# Patient Record
Sex: MALE
[De-identification: names, ages, dates, MRNs, and addresses within clinical notes are randomized per-mention and may not be internally consistent; named-entity substitution may affect disease eponyms.]

---

## 2017-04-10 ENCOUNTER — HOSPITAL ENCOUNTER (INPATIENT)
Dept: HOSPITAL 61 - 4 NORTH | Age: 62
LOS: 1 days | Discharge: HOME | DRG: 694 | End: 2017-04-11
Attending: INTERNAL MEDICINE | Admitting: INTERNAL MEDICINE
Payer: COMMERCIAL

## 2017-04-10 ENCOUNTER — HOSPITAL ENCOUNTER (EMERGENCY)
Dept: HOSPITAL 63 - ER | Age: 62
Discharge: ANOTHER HOSPITAL | End: 2017-04-10
Payer: COMMERCIAL

## 2017-04-10 VITALS — DIASTOLIC BLOOD PRESSURE: 76 MMHG | SYSTOLIC BLOOD PRESSURE: 147 MMHG

## 2017-04-10 VITALS — DIASTOLIC BLOOD PRESSURE: 57 MMHG | SYSTOLIC BLOOD PRESSURE: 122 MMHG

## 2017-04-10 VITALS — SYSTOLIC BLOOD PRESSURE: 162 MMHG | DIASTOLIC BLOOD PRESSURE: 92 MMHG

## 2017-04-10 VITALS — SYSTOLIC BLOOD PRESSURE: 149 MMHG | DIASTOLIC BLOOD PRESSURE: 84 MMHG

## 2017-04-10 VITALS — SYSTOLIC BLOOD PRESSURE: 148 MMHG | DIASTOLIC BLOOD PRESSURE: 89 MMHG

## 2017-04-10 VITALS — SYSTOLIC BLOOD PRESSURE: 157 MMHG | DIASTOLIC BLOOD PRESSURE: 71 MMHG

## 2017-04-10 VITALS — DIASTOLIC BLOOD PRESSURE: 74 MMHG | SYSTOLIC BLOOD PRESSURE: 140 MMHG

## 2017-04-10 VITALS — DIASTOLIC BLOOD PRESSURE: 87 MMHG | SYSTOLIC BLOOD PRESSURE: 121 MMHG

## 2017-04-10 VITALS — SYSTOLIC BLOOD PRESSURE: 149 MMHG | DIASTOLIC BLOOD PRESSURE: 103 MMHG

## 2017-04-10 DIAGNOSIS — N20.0: Primary | ICD-10-CM

## 2017-04-10 DIAGNOSIS — N20.2: Primary | ICD-10-CM

## 2017-04-10 DIAGNOSIS — N35.9: ICD-10-CM

## 2017-04-10 DIAGNOSIS — I10: ICD-10-CM

## 2017-04-10 DIAGNOSIS — E11.9: ICD-10-CM

## 2017-04-10 DIAGNOSIS — Z84.1: ICD-10-CM

## 2017-04-10 LAB
ALP SERPL-CCNC: 89 U/L (ref 46–116)
ALT SERPL-CCNC: 33 U/L (ref 16–63)
ANION GAP SERPL CALC-SCNC: 10 MMOL/L (ref 6–14)
AST SERPL-CCNC: 19 U/L (ref 15–37)
BASOPHILS # BLD AUTO: 0.1 X10^3/UL (ref 0–0.2)
BASOPHILS NFR BLD: 1 % (ref 0–3)
CA-I SERPL ISE-MCNC: 19 MG/DL (ref 8–26)
CALCIUM SERPL-MCNC: 8.8 MG/DL (ref 8.5–10.1)
CHLORIDE SERPL-SCNC: 108 MMOL/L (ref 98–107)
CO2 SERPL-SCNC: 25 MMOL/L (ref 21–32)
CREAT SERPL-MCNC: 1.4 MG/DL (ref 0.7–1.3)
EOSINOPHIL NFR BLD: 0.2 X10^3/UL (ref 0–0.7)
EOSINOPHIL NFR BLD: 2 % (ref 0–3)
ERYTHROCYTE [DISTWIDTH] IN BLOOD BY AUTOMATED COUNT: 13 % (ref 11.5–14.5)
GFR SERPLBLD BASED ON 1.73 SQ M-ARVRAT: 51 ML/MIN
GLUCOSE SERPL-MCNC: 140 MG/DL (ref 70–99)
HCT VFR BLD CALC: 44.3 % (ref 39–53)
HGB BLD-MCNC: 15 G/DL (ref 13–17.5)
LIPASE: 115 U/L (ref 73–393)
LYMPHOCYTES # BLD: 2.2 X10^3/UL (ref 1–4.8)
LYMPHOCYTES NFR BLD AUTO: 18 % (ref 24–48)
MCH RBC QN AUTO: 30 PG (ref 25–35)
MCHC RBC AUTO-ENTMCNC: 34 G/DL (ref 31–37)
MCV RBC AUTO: 88 FL (ref 79–100)
MONO #: 1 X10^3/UL (ref 0–1.1)
MONOCYTES NFR BLD: 8 % (ref 0–9)
NEUT #: 8.6 X10^3UL (ref 1.8–7.7)
NEUTROPHILS NFR BLD AUTO: 71 % (ref 31–73)
PLATELET # BLD AUTO: 205 X10^3/UL (ref 140–400)
POTASSIUM SERPL-SCNC: 3.7 MMOL/L (ref 3.5–5.1)
RBC # BLD AUTO: 5.01 X10^6/UL (ref 4.3–5.7)
SODIUM SERPL-SCNC: 143 MMOL/L (ref 136–145)
WBC # BLD AUTO: 12 X10^3/UL (ref 4–11)

## 2017-04-10 PROCEDURE — 74176 CT ABD & PELVIS W/O CONTRAST: CPT

## 2017-04-10 PROCEDURE — 99285 EMERGENCY DEPT VISIT HI MDM: CPT

## 2017-04-10 PROCEDURE — S0028 INJECTION, FAMOTIDINE, 20 MG: HCPCS

## 2017-04-10 PROCEDURE — 90732 PPSV23 VACC 2 YRS+ SUBQ/IM: CPT

## 2017-04-10 PROCEDURE — 36415 COLL VENOUS BLD VENIPUNCTURE: CPT

## 2017-04-10 PROCEDURE — 80048 BASIC METABOLIC PNL TOTAL CA: CPT

## 2017-04-10 PROCEDURE — 0T768DZ DILATION OF RIGHT URETER WITH INTRALUMINAL DEVICE, VIA NATURAL OR ARTIFICIAL OPENING ENDOSCOPIC: ICD-10-PCS | Performed by: UROLOGY

## 2017-04-10 PROCEDURE — 96374 THER/PROPH/DIAG INJ IV PUSH: CPT

## 2017-04-10 PROCEDURE — 74000: CPT

## 2017-04-10 PROCEDURE — 85027 COMPLETE CBC AUTOMATED: CPT

## 2017-04-10 PROCEDURE — C2617 STENT, NON-COR, TEM W/O DEL: HCPCS

## 2017-04-10 PROCEDURE — BT1D1ZZ FLUOROSCOPY OF RIGHT KIDNEY, URETER AND BLADDER USING LOW OSMOLAR CONTRAST: ICD-10-PCS | Performed by: UROLOGY

## 2017-04-10 PROCEDURE — 96375 TX/PRO/DX INJ NEW DRUG ADDON: CPT

## 2017-04-10 PROCEDURE — 84460 ALANINE AMINO (ALT) (SGPT): CPT

## 2017-04-10 PROCEDURE — 74420 UROGRAPHY RTRGR +-KUB: CPT

## 2017-04-10 PROCEDURE — 84450 TRANSFERASE (AST) (SGOT): CPT

## 2017-04-10 PROCEDURE — 84075 ASSAY ALKALINE PHOSPHATASE: CPT

## 2017-04-10 PROCEDURE — 83690 ASSAY OF LIPASE: CPT

## 2017-04-10 PROCEDURE — C1769 GUIDE WIRE: HCPCS

## 2017-04-10 PROCEDURE — 82947 ASSAY GLUCOSE BLOOD QUANT: CPT

## 2017-04-10 RX ADMIN — HYDROCODONE BITARTRATE AND ACETAMINOPHEN PRN TAB: 5; 325 TABLET ORAL at 23:33

## 2017-04-10 RX ADMIN — DOCUSATE SODIUM SCH MG: 100 CAPSULE, LIQUID FILLED ORAL at 23:33

## 2017-04-10 RX ADMIN — BACITRACIN SCH MLS/HR: 5000 INJECTION, POWDER, FOR SOLUTION INTRAMUSCULAR at 23:35

## 2017-04-10 RX ADMIN — FENTANYL CITRATE PRN MCG: 50 INJECTION INTRAMUSCULAR; INTRAVENOUS at 18:43

## 2017-04-10 RX ADMIN — FENTANYL CITRATE PRN MCG: 50 INJECTION INTRAMUSCULAR; INTRAVENOUS at 21:02

## 2017-04-10 RX ADMIN — MORPHINE SULFATE PRN MG: 4 INJECTION, SOLUTION INTRAMUSCULAR; INTRAVENOUS at 23:34

## 2017-04-10 RX ADMIN — FENTANYL CITRATE PRN MCG: 50 INJECTION INTRAMUSCULAR; INTRAVENOUS at 18:36

## 2017-04-10 NOTE — ED.ADGEN
Past History


Past Medical History:  Diabetes, Hypertension, Other


Past Surgical History:  Other


Alcohol Use:  None


Drug Use:  None





Adult General


HPI


HPI





Patient is a 62-year-old male presents emergency department complaining of 

right flank pain that began suddenly just prior to arrival. He has had no 

associated nausea or vomiting. He has had no fever or chills. He denies any 

history of kidney or renal issues in the past. He has had no prehospital 

intervention.





Review of Systems


Review of Systems





Constitutional: Denies fever or chills []


Eyes: Denies change in visual acuity, redness, or eye pain []


HENT: Denies nasal congestion or sore throat []


Respiratory: Denies cough or shortness of breath []


Cardiovascular: No additional information not addressed in HPI []


GI: Denies abdominal pain, nausea, vomiting, bloody stools or diarrhea []


: Denies dysuria or hematuria []


Musculoskeletal: Denies back pain or joint pain []


Integument: Denies rash or skin lesions []


Neurologic: Denies headache, focal weakness or sensory changes []


Endocrine: Denies polyuria or polydipsia []





Current Medications


Current Medications





 Current Medications








 Medications


  (Trade)  Dose


 Ordered  Sig/Radha  Start Time


 Stop Time Status Last Admin


Dose Admin


 


 Fentanyl Citrate


  (Fentanyl 2ml


 Vial)  75 mcg  1X  ONCE  4/10/17 12:15


 4/10/17 12:16 DC 4/10/17 12:16


75 MCG


 


 Hydromorphone HCl


  (Dilaudid)  1 mg  1X  ONCE  4/10/17 13:15


 4/10/17 13:16 DC 4/10/17 13:05


1 MG


 


 Ketorolac


 Tromethamine


  (Toradol)  15 mg  1X  ONCE  4/10/17 13:15


 4/10/17 13:16 DC  


 


 


 Ondansetron HCl


  (Zofran)  4 mg  1X  ONCE  4/10/17 12:20


 4/10/17 12:21 DC 4/10/17 12:16


4 MG











Allergies


Allergies





 Allergies








Coded Allergies Type Severity Reaction Last Updated Verified


 


  No Known Drug Allergies    4/10/17 No











Physical Exam


Physical Exam





Constitutional: Well developed, well nourished, no acute distress, non-toxic 

appearance. []


HENT: Normocephalic, atraumatic, bilateral external ears normal, oropharynx 

moist, no oral exudates, nose normal. []


Eyes: PERRLA, EOMI, conjunctiva normal, no discharge. [] 


Neck: Normal range of motion, no tenderness, supple, no stridor. [] 


Cardiovascular:Heart rate regular rhythm, no murmur []


Lungs & Thorax:  Bilateral breath sounds clear to auscultation []


Abdomen: Bowel sounds normal, soft, no tenderness, no masses, no pulsatile 

masses. [] 


Skin: Warm, dry, no erythema, no rash. [] 


Back: No tenderness, right CVA tenderness. [] 


Extremities: No tenderness, no cyanosis, no clubbing, ROM intact, no edema. [] 


Neurologic: Alert and oriented X 3, normal motor function, normal sensory 

function, no focal deficits noted. []


Psychologic: Affect normal, judgement normal, mood normal. []





Current Patient Data


Vital Signs





 Vital Signs








  Date Time  Temp Pulse Resp B/P Pulse Ox O2 Delivery O2 Flow Rate FiO2


 


4/10/17 14:06 97.3 70 16 163/74 98 Room Air  








Lab Results





 Laboratory Tests








Test


  4/10/17


11:50


 


White Blood Count


  12.0x10^3/uL


(4.0-11.0)  H


 


Red Blood Count


  5.01x10^6/uL


(4.30-5.70)


 


Hemoglobin


  15.0g/dL


(13.0-17.5)


 


Hematocrit


  44.3%


(39.0-53.0)


 


Mean Corpuscular Volume 88fL ()  


 


Mean Corpuscular Hemoglobin 30pg (25-35)  


 


Mean Corpuscular Hemoglobin


Concent 34g/dL (31-37)


 


 


Red Cell Distribution Width


  13.0%


(11.5-14.5)


 


Platelet Count


  205x10^3/uL


(140-400)


 


Neutrophils (%) (Auto) 71% (31-73)  


 


Lymphocytes (%) (Auto) 18% (24-48)  L


 


Monocytes (%) (Auto) 8% (0-9)  


 


Eosinophils (%) (Auto) 2% (0-3)  


 


Basophils (%) (Auto) 1% (0-3)  


 


Neutrophils # (Auto)


  8.6x10^3uL


(1.8-7.7)  H


 


Lymphocytes # (Auto)


  2.2x10^3/uL


(1.0-4.8)


 


Monocytes # (Auto)


  1.0x10^3/uL


(0.0-1.1)


 


Eosinophils # (Auto)


  0.2x10^3/uL


(0.0-0.7)


 


Basophils # (Auto)


  0.1x10^3/uL


(0.0-0.2)


 


Sodium Level


  143mmol/L


(136-145)


 


Potassium Level


  3.7mmol/L


(3.5-5.1)


 


Chloride Level


  108mmol/L


()  H


 


Carbon Dioxide Level


  25mmol/L


(21-32)


 


Anion Gap 10 (6-14)  


 


Blood Urea Nitrogen


  19mg/dL (8-26)


 


 


Creatinine


  1.4mg/dL


(0.7-1.3)  H


 


Estimated GFR


(Cockcroft-Gault) 51.0  


 


 


Glucose Level


  140mg/dL


(70-99)  H


 


Calcium Level


  8.8mg/dL


(8.5-10.1)


 


Aspartate Amino Transferase


(AST) 19U/L (15-37)  


 


 


Alanine Aminotransferase (ALT) 33U/L (16-63)  


 


Alkaline Phosphatase


  89U/L ()


 


 


Lipase


  115U/L


()











EKG


EKG


[]





Radiology/Procedures


Radiology/Procedures


CT of the abdomen and pelvis without contrast, 4/10/2017:





History: Right flank pain





Noncontrast scans were obtained through the urinary tract utilizing the renal


stone protocol. This is a limited study for evaluation of the possibility of


urinary tract calculi.





There is moderate perinephric edema on the right. The right renal pelvis and


proximal right ureter are mildly dilated. There is a 9 x 5 mm obstructing


calculus in the proximal right ureter at the L2-3 level. The distal right


ureter is unremarkable.





There is a 10 mm nonobstructing calculus in the lower pole of the left kidney.


The left renal collecting system and left ureter are unremarkable. The


partially filled urinary bladder shows no abnormality.





The prostate gland is mildly enlarged measuring 5.5 cm in width. It contains


calcifications.





The unopacified liver is unremarkable. No gallbladder abnormality is seen.


There is fatty infiltration of the pancreas. The spleen is of normal size.





There is mild aortic calcific plaquing without evidence of aneurysm. No


abdominal or pelvic adenopathy is seen.





Several small sigmoid diverticula are noted. No paracolonic inflammation is


evident. The bowel loops are not dilated. The appendix is visualized and shows


no abnormality. No free fluid or free air is evident in the abdomen or pelvis.





Moderate degenerative changes are present in the lower lumbar spine. There is


a chronic appearing mild spondylolisthesis at L5-S1 with partial bony fusion


of the disc space.








IMPRESSION:


1. 9 x 5 mm obstructing calculus in the proximal right ureter with moderate


perinephric edema.


2. Single nonobstructing left intrarenal calculus.


3. Mild prostatic enlargement.

















PQRS Compliance Statement:





One or more of the following individualized dose reduction techniques were


utilized for this examination:


1. Automated exposure control


2. Adjustment of the mA and/or kV according to patient size


3. Use of iterative reconstruction technique[]





Course & Med Decision Making


Course & Med Decision Making


Pertinent Labs and Imaging studies reviewed. (See chart for details)





[]





Final Impression


Final Impression


Kidney stone []


Problems:  





Dragon Disclaimer


Dragon Disclaimer


This electronic medical record was generated, in whole or in part, using a 

voice recognition dictation system.








JORDI PÉREZ MD Apr 10, 2017 12:27

## 2017-04-10 NOTE — PDOC
PROGRESS NOTES


Subjective


Subjective


Pt. with obstructing 10 X 5 mm right proximal ureteral stone





Objective


Objective


right proximal ureteral stone





Physical Exam


Physical Exam


Tender right flank and abdomen





Assessment


Assessment


I discussed with the patient his stone and we discussed the options, 

alternatives, benefits, risks, and possible complications of medical expulsive 

therapy vs. surgical intervention with cystoscopy, right retrograde pyelogram, 

and possible right ureteral stent placement. Pt. understands and wishes to 

proceed with operation. Will proceed accordingly.





Comment


Review of Relevant


I have reviewed the following items oseas (where applicable) has been applied.








ANGELES MCELROY MD Apr 10, 2017 16:16

## 2017-04-10 NOTE — OP
DATE OF SURGERY:  04/10/2017



OPERATION:  Cystoscopy, right retrograde pyelogram; right ureteral stent

placement and Carroll catheter placement.



SURGEON:  Angeles Zhang MD



ANESTHESIA:  General.



PREOPERATIVE DIAGNOSIS:  Right ureteral stone.



POSTOPERATIVE DIAGNOSIS:  Right ureteral stone with urethral stricture.



INDICATIONS:  The patient is a very pleasant 62-year-old white male, who

presented with right-sided flank and abdominal pain, was found to have a 10 x 5

mm right proximal ureteral stone with obstruction.  The patient continues to

have right renal colic.  I discussed with the patient the option of

alternatives, benefits, risks and possible complications of medical expulsive

therapy versus surgical intervention with cystoscopy, right retrograde

pyelogram, possible right ureteral stent placement.  He understands this and he

wish to proceed ahead with operation.



DESCRIPTION OF PROCEDURE:  After obtaining informed consent, the patient was

taken to operating room and after an excellent general anesthetic, the patient

was placed in a dorsolithotomy position.  Groin was prepped and draped in

sterile fashion.  The patient was preloaded with IV antibiotics.  Panendoscopy

and cystoscopy were then performed with the 30 and 70-degree lens and the

21-Yoruba cystoscope sheath.  Penile urethra was found grossly normal.  The

patient noted to have a fairly tight, but soft bulbar urethral stricture, which

was able to be dilated with the beak of the cystoscope under direct vision. 

External sphincter appeared intact.  Prostatic urethra showed some moderate

bilobar enlargement.  Bladder was entered and inspected.  The patient noted to

have moderate trabeculation of bladder.  Both ureteral orifices were identified

and found to be grossly patent.  On fluoroscopy, the stone was seen in the area

of the right proximal ureter, right retrograde pyelogram was performed and the

filling defect was seen in the right proximal ureter, consistent with the

patient's stone with dilation of the right proximal ureter, renal pelvis and

calices.  Following this, floppy-tipped ZIPwire was passed up the right ureter,

past the stone to the right kidney and it appeared that the stone rolled back to

the right renal pelvis at the time of the floppy-tipped ZIPwire placement. 

Following this, a 6 x 28 double-J stent was then passed up the ZIPwire, placing

one curl in the renal pelvis and the other curl in the bladder and the ZIPwire

removed.  Stent position was confirmed with fluoroscopy and direct vision, found

to be in good position.  Following this, the cystoscope was withdrawn from the

patient and a 16-Yoruba Carroll catheter placed per urethra and bladder connected

to gravity drainage.  Clear drainage was noted.  Therefore, Carroll balloon was

inflated and the catheter connected to gravity drainage and secured the

patient's left thigh with a StatLock.  The patient tolerated the procedure very

well, was taken to recovery room in stable condition.



Plan will be to remove the Carroll catheter in the first postoperative morning

then the patient discharged home and then have him follow up with Urology in the

next week ____ schedule definitive treatment of the stone as an outpatient with

either ESWL or ureteroscopy and laser lithotripsy.

 



______________________________

ANGELES ZHANG MD



DR:  DEANNA/gordon  JOB#:  [id number] / [id number]

DD:  04/10/2017 18:21  DT:  04/10/2017 22:37

## 2017-04-10 NOTE — RAD
CT of the abdomen and pelvis without contrast, 4/10/2017:



History: Right flank pain



Noncontrast scans were obtained through the urinary tract utilizing the renal

stone protocol. This is a limited study for evaluation of the possibility of

urinary tract calculi.



There is moderate perinephric edema on the right. The right renal pelvis and

proximal right ureter are mildly dilated. There is a 9 x 5 mm obstructing

calculus in the proximal right ureter at the L2-3 level. The distal right

ureter is unremarkable.



There is a 10 mm nonobstructing calculus in the lower pole of the left kidney.

The left renal collecting system and left ureter are unremarkable. The

partially filled urinary bladder shows no abnormality.



The prostate gland is mildly enlarged measuring 5.5 cm in width. It contains

calcifications.



The unopacified liver is unremarkable. No gallbladder abnormality is seen.

There is fatty infiltration of the pancreas. The spleen is of normal size.



There is mild aortic calcific plaquing without evidence of aneurysm. No

abdominal or pelvic adenopathy is seen.



Several small sigmoid diverticula are noted. No paracolonic inflammation is

evident. The bowel loops are not dilated. The appendix is visualized and shows

no abnormality. No free fluid or free air is evident in the abdomen or pelvis.



Moderate degenerative changes are present in the lower lumbar spine. There is

a chronic appearing mild spondylolisthesis at L5-S1 with partial bony fusion

of the disc space.





IMPRESSION:

1. 9 x 5 mm obstructing calculus in the proximal right ureter with moderate

perinephric edema.

2. Single nonobstructing left intrarenal calculus.

3. Mild prostatic enlargement.











PQRS Compliance Statement:



One or more of the following individualized dose reduction techniques were

utilized for this examination:

1. Automated exposure control

2. Adjustment of the mA and/or kV according to patient size

3. Use of iterative reconstruction technique

## 2017-04-10 NOTE — PDOC4
Operative Note


Operative Note


pre-op dx-right ureteral stone


post-op dx-same with urethral stricture


procedure-cystoscopy, right retrograde pyelogram, right ureteral stent placement

, alston catheter placement


surgeon-adan claire-general


Pt. to PACU in stable condition








ANGELES MCELROY MD Apr 10, 2017 18:16

## 2017-04-11 VITALS — SYSTOLIC BLOOD PRESSURE: 130 MMHG | DIASTOLIC BLOOD PRESSURE: 62 MMHG

## 2017-04-11 VITALS
DIASTOLIC BLOOD PRESSURE: 74 MMHG | SYSTOLIC BLOOD PRESSURE: 130 MMHG | SYSTOLIC BLOOD PRESSURE: 130 MMHG | DIASTOLIC BLOOD PRESSURE: 74 MMHG

## 2017-04-11 VITALS — DIASTOLIC BLOOD PRESSURE: 51 MMHG | SYSTOLIC BLOOD PRESSURE: 103 MMHG

## 2017-04-11 VITALS — DIASTOLIC BLOOD PRESSURE: 70 MMHG | SYSTOLIC BLOOD PRESSURE: 125 MMHG

## 2017-04-11 LAB
ANION GAP SERPL CALC-SCNC: 11 MMOL/L (ref 6–14)
BUN SERPL-MCNC: 18 MG/DL (ref 8–26)
CALCIUM SERPL-MCNC: 8.2 MG/DL (ref 8.5–10.1)
CHLORIDE SERPL-SCNC: 106 MMOL/L (ref 98–107)
CO2 SERPL-SCNC: 24 MMOL/L (ref 21–32)
CREAT SERPL-MCNC: 1.2 MG/DL (ref 0.7–1.3)
ERYTHROCYTE [DISTWIDTH] IN BLOOD BY AUTOMATED COUNT: 13.3 % (ref 11.5–14.5)
GFR SERPLBLD BASED ON 1.73 SQ M-ARVRAT: 61 ML/MIN
GLUCOSE SERPL-MCNC: 161 MG/DL (ref 70–99)
HCT VFR BLD CALC: 41.9 % (ref 39–53)
HGB BLD-MCNC: 13.9 G/DL (ref 13–17.5)
MCH RBC QN AUTO: 30 PG (ref 25–35)
MCHC RBC AUTO-ENTMCNC: 33 G/DL (ref 31–37)
MCV RBC AUTO: 90 FL (ref 79–100)
PLATELET # BLD AUTO: 192 X10^3/UL (ref 140–400)
POTASSIUM SERPL-SCNC: 4.1 MMOL/L (ref 3.5–5.1)
RBC # BLD AUTO: 4.66 X10^6/UL (ref 4.3–5.7)
SODIUM SERPL-SCNC: 141 MMOL/L (ref 136–145)
WBC # BLD AUTO: 10.9 X10^3/UL (ref 4–11)

## 2017-04-11 RX ADMIN — BACITRACIN SCH MLS/HR: 5000 INJECTION, POWDER, FOR SOLUTION INTRAMUSCULAR at 11:50

## 2017-04-11 RX ADMIN — CEFAZOLIN SCH MLS/HR: 1 INJECTION, POWDER, FOR SOLUTION INTRAMUSCULAR; INTRAVENOUS at 08:45

## 2017-04-11 RX ADMIN — CEFAZOLIN SCH MLS/HR: 1 INJECTION, POWDER, FOR SOLUTION INTRAMUSCULAR; INTRAVENOUS at 01:00

## 2017-04-11 RX ADMIN — HYDROCODONE BITARTRATE AND ACETAMINOPHEN PRN TAB: 5; 325 TABLET ORAL at 13:59

## 2017-04-11 RX ADMIN — MORPHINE SULFATE PRN MG: 4 INJECTION, SOLUTION INTRAMUSCULAR; INTRAVENOUS at 02:20

## 2017-04-11 RX ADMIN — DOCUSATE SODIUM SCH MG: 100 CAPSULE, LIQUID FILLED ORAL at 08:45

## 2017-04-11 RX ADMIN — CEFAZOLIN SCH MLS/HR: 1 INJECTION, POWDER, FOR SOLUTION INTRAMUSCULAR; INTRAVENOUS at 17:00

## 2017-04-11 NOTE — HP
ADMIT DATE:  04/10/2017



CHIEF COMPLAINT:  Flank pain.



HISTORY OF PRESENT ILLNESS:  The patient is a pleasant middle-aged male who

presents with right flank pain has been occurring for several days.



IMAGING STUDIES:  The ER showing a right 9 mm stone, the ER doctor has called

me, we are transferring the patient for Urology consultation.  The patient has

just been taken to the OR for cystoscopy with ureteral stent placement.  I

discussed the case with Dr. Zhang.  The patient is now being examined in the

postop area.



PAST MEDICAL HISTORY:  Diabetes.



ALLERGIES:  None.



FAMILY HISTORY:  Renal stone.



SOCIAL HISTORY:  Does not drink, smoke or take drugs.



MEDICATIONS:  Reviewed, please refer to the [facility].



REVIEW OF SYSTEMS:  Unobtainable, the patient is too confused.  He is still

____.



PHYSICAL EXAMINATION:

VITAL SIGNS:  Temperature afebrile, pulse 63, respirations 18, blood pressure

122/57.

GENERAL:  He is sleeping.  In the postop area, he awakens, he is confused.

HEART:  Distant S1, S2.

LUNGS:  Clear.

ABDOMEN:  Soft, positive bowel sounds.

EXTREMITIES:  No edema.

SKIN:  No rashes.

ENDOCRINE:  No thyromegaly.

LYMPHATICS:  No cervical nodes.

HEMATOPOIETIC:  No bruising.



LABORATORY DATA:  Pending.



ASSESSMENT AND PLAN:  Postop cystoscopy with right ureteral stent placement with

a 9 mm stone.  The patient has been admitted.  We were given IV fluids, IV

antibiotics, IV narcotics.  Hope to discharge tomorrow if he is doing better.

 



______________________________

MITCHEL LAUGHLIN DO DR:  BRYSON/gordon  JOB#:  [id number] / [id number]

DD:  04/10/2017 23:34  DT:  04/11/2017 00:19

## 2017-04-11 NOTE — PDOC
PROGRESS NOTES


Chief Complaint


Chief Complaint


Right Flank pain 





9mm obstructing Kidney stone


s/p cystoscopy with uretheral stent placement


Diabetes Mellitus, non-insulin dependent





History of Present Illness


History of Present Illness


Patient seen and evaluated at bedside. No acute events overnight. Carroll removed 

per Dr. Zhang. Patient reports continuing flank pain and urethral pain.  d/w 

nurse.





Vitals


Vitals





 Vital Signs








  Date Time  Temp Pulse Resp B/P Pulse Ox O2 Delivery O2 Flow Rate FiO2


 


4/11/17 11:00 98.1 61 20 130/62 96 Room Air  





 98.1       


 


4/10/17 18:43       10.0 











Physical Exam


General:  Alert, Oriented X3, No acute distress


Heart:  Regular rate, Normal S1


Lungs:  Clear, Other (negative accessory muscle use )


Abdomen:  Normal bowel sounds, Soft, No tenderness, Other (R flank tenderness 

to palpation. )


Extremities:  No clubbing, No cyanosis, No edema


Skin:  No rashes, No significant lesion, Other (Negative skin breakdown, 

erythema, or bleeding to glans penis. )





Labs


LABS





Laboratory Tests








Test


  4/10/17


16:50 4/11/17


06:50


 


Glucose (Fingerstick)


  125mg/dL


(70-99) 


 


 


White Blood Count


  


  10.9x10^3/uL


(4.0-11.0)


 


Red Blood Count


  


  4.66x10^6/uL


(4.30-5.70)


 


Hemoglobin


  


  13.9g/dL


(13.0-17.5)


 


Hematocrit


  


  41.9%


(39.0-53.0)


 


Mean Corpuscular Volume  90fL () 


 


Mean Corpuscular Hemoglobin  30pg (25-35) 


 


Mean Corpuscular Hemoglobin


Concent 


  33g/dL (31-37) 


 


 


Red Cell Distribution Width


  


  13.3%


(11.5-14.5)


 


Platelet Count


  


  192x10^3/uL


(140-400)


 


Sodium Level


  


  141mmol/L


(136-145)


 


Potassium Level


  


  4.1mmol/L


(3.5-5.1)


 


Chloride Level


  


  106mmol/L


()


 


Carbon Dioxide Level


  


  24mmol/L


(21-32)


 


Anion Gap  11 (6-14) 


 


Blood Urea Nitrogen  18mg/dL (8-26) 


 


Creatinine


  


  1.2mg/dL


(0.7-1.3)


 


Estimated GFR


(Cockcroft-Gault) 


  61.0 


 


 


Glucose Level


  


  161mg/dL


(70-99)


 


Calcium Level


  


  8.2mg/dL


(8.5-10.1)











Review of Systems


Review of Systems


(+) r flank pain


(+) penile pain 





Denies chest pain, sob, abdominal pain, n/v/d, or fever/chills.





Assessment and Plan


Assessmemt and Plan


 Problems


Medical Problems:


(1) Renal stone


Status: Acute  





Assessment:


1.) R 9mm obstructing Kidney stone


2.) POD#1 s/p cystoscopy with uretheral stent placement


3.) Diabetes Mellitus, non-insulin dependent 





Plan:


1.) continue IVF and pain control


2.) discharge agreeable with Dr. Zhang; Rx for pyridine, ciprofloxacin, and 

pain management. 


3.) f/u with Dr. Zhang in 1-2 weeks; Probable ESWL in 2-3 weeks. f/u with pcp 


4.) continue home medications


5.) activity as tolerated


Problems:  





Comment


Review of Relevant


I have reviewed the following items oseas (where applicable) has been applied.


Labs





Laboratory Tests








Test


  4/10/17


16:50 4/11/17


06:50


 


Glucose (Fingerstick)


  125mg/dL


(70-99) 


 


 


White Blood Count


  


  10.9x10^3/uL


(4.0-11.0)


 


Red Blood Count


  


  4.66x10^6/uL


(4.30-5.70)


 


Hemoglobin


  


  13.9g/dL


(13.0-17.5)


 


Hematocrit


  


  41.9%


(39.0-53.0)


 


Mean Corpuscular Volume  90fL () 


 


Mean Corpuscular Hemoglobin  30pg (25-35) 


 


Mean Corpuscular Hemoglobin


Concent 


  33g/dL (31-37) 


 


 


Red Cell Distribution Width


  


  13.3%


(11.5-14.5)


 


Platelet Count


  


  192x10^3/uL


(140-400)


 


Sodium Level


  


  141mmol/L


(136-145)


 


Potassium Level


  


  4.1mmol/L


(3.5-5.1)


 


Chloride Level


  


  106mmol/L


()


 


Carbon Dioxide Level


  


  24mmol/L


(21-32)


 


Anion Gap  11 (6-14) 


 


Blood Urea Nitrogen  18mg/dL (8-26) 


 


Creatinine


  


  1.2mg/dL


(0.7-1.3)


 


Estimated GFR


(Cockcroft-Gault) 


  61.0 


 


 


Glucose Level


  


  161mg/dL


(70-99)


 


Calcium Level


  


  8.2mg/dL


(8.5-10.1)








Laboratory Tests








Test


  4/10/17


16:50 4/11/17


06:50


 


Glucose (Fingerstick)


  125mg/dL


(70-99) 


 


 


White Blood Count


  


  10.9x10^3/uL


(4.0-11.0)


 


Red Blood Count


  


  4.66x10^6/uL


(4.30-5.70)


 


Hemoglobin


  


  13.9g/dL


(13.0-17.5)


 


Hematocrit


  


  41.9%


(39.0-53.0)


 


Mean Corpuscular Volume  90fL () 


 


Mean Corpuscular Hemoglobin  30pg (25-35) 


 


Mean Corpuscular Hemoglobin


Concent 


  33g/dL (31-37) 


 


 


Red Cell Distribution Width


  


  13.3%


(11.5-14.5)


 


Platelet Count


  


  192x10^3/uL


(140-400)


 


Sodium Level


  


  141mmol/L


(136-145)


 


Potassium Level


  


  4.1mmol/L


(3.5-5.1)


 


Chloride Level


  


  106mmol/L


()


 


Carbon Dioxide Level


  


  24mmol/L


(21-32)


 


Anion Gap  11 (6-14) 


 


Blood Urea Nitrogen  18mg/dL (8-26) 


 


Creatinine


  


  1.2mg/dL


(0.7-1.3)


 


Estimated GFR


(Cockcroft-Gault) 


  61.0 


 


 


Glucose Level


  


  161mg/dL


(70-99)


 


Calcium Level


  


  8.2mg/dL


(8.5-10.1)








Medications





 Current Medications


Ondansetron HCl 4 mg 4 mg STK-MED ONCE .ROUTE ;  Start 4/10/17 at 16:26;  Stop 4

/10/17 at 16:27;  Status DC


Propofol (Diprivan) 20 ml @ As Directed STK-MED ONCE IV ;  Start 4/10/17 at 16:

26;  Stop 4/10/17 at 16:27;  Status DC


Lidocaine HCl 5 ml 5 ml STK-MED ONCE .ROUTE ;  Start 4/10/17 at 16:26;  Stop 4/

10/17 at 16:27;  Status DC


Cefazolin Sodium/ Dextrose (Ancef 2gm Premix) 50 ml @ As Directed STK-MED ONCE 

IV ;  Start 4/10/17 at 16:30;  Stop 4/10/17 at 16:31;  Status DC


Lidocaine HCl (Glydo (Lidocaine) Jelly) 6 hortensia STK-MED ONCE .ROUTE  Last 

administered on 4/10/17at 18:02;  Start 4/10/17 at 16:43;  Stop 4/10/17 at 16:44

;  Status DC


Iohexol (Omnipaque 300 Mg/ml) 50 ml STK-MED ONCE .ROUTE  Last administered on 4/

10/17at 17:17;  Start 4/10/17 at 16:43;  Stop 4/10/17 at 16:44;  Status DC


Lidocaine HCl 6 hortensia 6 hortensia STK-MED ONCE .ROUTE  Last administered on 4/10/17at 18

:02;  Start 4/10/17 at 16:43;  Stop 4/10/17 at 16:44;  Status DC


Cefazolin Sodium/ Dextrose (Ancef 2gm Premix) 50 ml @  100 mls/hr 1X  ONCE IV  

Last administered on 4/10/17at 17:21;  Start 4/10/17 at 16:45;  Stop 4/10/17 at 

17:14;  Status DC


Fentanyl Citrate (Fentanyl 2ml Vial) 100 mcg STK-MED ONCE .ROUTE ;  Start 4/10/

17 at 16:52;  Stop 4/10/17 at 16:53;  Status DC


Dexamethasone Sodium Phosphate (Decadron) 20 mg STK-MED ONCE .ROUTE ;  Start 4/

10/17 at 17:35;  Stop 4/10/17 at 17:36;  Status DC


Famotidine (Pepcid) 20 mg STK-MED ONCE .ROUTE ;  Start 4/10/17 at 17:35;  Stop 4

/10/17 at 17:36;  Status DC


Ondansetron HCl (Zofran) 4 mg STK-MED ONCE .ROUTE ;  Start 4/10/17 at 17:35;  

Stop 4/10/17 at 17:36;  Status DC


Ondansetron HCl (Zofran) 4 mg PRN Q6HRS  PRN IV NAUSEA/VOMITING;  Start 4/10/17 

at 18:00;  Stop 4/11/17 at 17:59


Fentanyl Citrate (Fentanyl 2ml Vial) 25 mcg PRN Q5MIN  PRN IV MILD PAIN;  Start 

4/10/17 at 18:00;  Stop 4/11/17 at 17:59


Fentanyl Citrate (Fentanyl 2ml Vial) 50 mcg PRN Q5MIN  PRN IV MODERATE PAIN 

Last administered on 4/10/17at 21:02;  Start 4/10/17 at 18:00;  Stop 4/11/17 at 

17:59


Morphine Sulfate 1 mg 1 mg PRN Q10MIN  PRN IV SEVERE PAIN;  Start 4/10/17 at 18:

00;  Stop 4/11/17 at 17:59


Lactated Ringer's (Iv Lactated Ringers) 1,000 ml @  0 mls/hr Q0M IV ;  Start 4/

10/17 at 17:48;  Stop 4/11/17 at 05:47;  Status DC


Lidocaine HCl 2 ml PRN 1X  PRN ID PRIOR TO IV START;  Start 4/10/17 at 18:00;  

Stop 4/11/17 at 17:59


Hydromorphone HCl (Dilaudid) 0.5 mg PRN Q10MIN  PRN IV SEV PAIN, Second choice;

  Start 4/10/17 at 18:00;  Stop 4/11/17 at 17:59


Prochlorperazine Edisylate (Compazine) 5 mg PACU PRN  PRN IV NAUSEA, MRX1;  

Start 4/10/17 at 18:00;  Stop 4/11/17 at 17:59


Sevoflurane (Ultane) 60 ml STK-MED ONCE IH ;  Start 4/10/17 at 18:05;  Stop 4/10

/17 at 18:06;  Status DC


Sevoflurane (Ultane) 15 ml STK-MED ONCE IH ;  Start 4/10/17 at 18:05;  Stop 4/10

/17 at 18:06;  Status DC


Sevoflurane 30 ml 30 ml STK-MED ONCE IH ;  Start 4/10/17 at 18:05;  Stop 4/10/

17 at 18:06;  Status DC


Cefazolin Sodium/ Sodium Chloride (Ancef/Iv Sodium Chloride 0.9% 50ml) 50 ml @  

100 mls/hr Q8H IV  Last administered on 4/11/17at 08:45;  Start 4/11/17 at 01:00


Pneumococcal Polyvalent Vaccine (Do NOT chart on this placeholder) 1 each PRN 

1X  PRN MC SEE COMMENTS;  Start 4/10/17 at 18:45;  Status UNV


Pneumococcal Polyvalent Vaccine (Pneumovax 23) 0.5 ml ONCE ONCE VAX IM  Last 

administered on 4/11/17at 08:56;  Start 4/11/17 at 09:00;  Stop 4/11/17 at 09:01

;  Status DC


Morphine Sulfate 2 mg PRN Q2HR  PRN IV PAIN Last administered on 4/11/17at 08:46

;  Start 4/10/17 at 22:30


Morphine Sulfate 4 mg PRN Q2HR  PRN IV PAIN Last administered on 4/11/17at 02:20

;  Start 4/10/17 at 22:30


Acetaminophen/ Hydrocodone Bitart (Lortab 5/325) 1 tab PRN Q4HRS  PRN PO PAIN 

Last administered on 4/10/17at 23:33;  Start 4/10/17 at 22:30


Docusate Sodium 100 mg 100 mg BID PO  Last administered on 4/11/17at 08:45;  

Start 4/10/17 at 22:30


Sodium Chloride (Iv Sodium Chloride 0.9% 1000ml Bag) 1,000 ml @  75 mls/hr 

[id number] IV  Last administered on 4/10/17at 23:35;  Start 4/10/17 at 22:30





Active Scripts


Active


Reported


Metformin Hcl 500 Mg Tablet 1 Tab PO BID


Vitals/I & O





 Vital Sign - Last 24 Hours








 4/10/17 4/10/17 4/10/17 4/10/17





 16:00 16:00 16:30 18:12


 


Temp 97.4  97.8 





 97.4  97.8 


 


Pulse 73  57 


 


Resp 18  20 


 


B/P 121/87  158/91 


 


Pulse Ox 98  97 


 


O2 Delivery Room Air Room Air Room Air Mask


 


O2 Flow Rate    10


 


    





    





 4/10/17 4/10/17 4/10/17 4/10/17





 18:12 18:27 18:36 18:40


 


Temp 97.4   





 97.4   


 


Pulse 83 62  


 


Resp 18 18  


 


B/P 154/81 149/78  


 


Pulse Ox 95 100 100 


 


O2 Delivery Simple Mask Simple Mask Simple Mask Room Air


 


O2 Flow Rate 10 10 10.0 


 


    





    





 4/10/17 4/10/17 4/10/17 4/10/17





 18:42 18:43 19:15 19:30


 


Temp 99.1  98.1 98.3





 99.1  98.1 98.3


 


Pulse 62  95 72


 


Resp 18  18 20


 


B/P 157/73  147/76 157/71


 


Pulse Ox 98 100 95 94


 


O2 Delivery Room Air Room Air Room Air Room Air


 


O2 Flow Rate  10.0  


 


    





    





 4/10/17 4/10/17 4/10/17 4/10/17





 19:45 20:00 20:15 20:45


 


Temp 98.3  98.1 98.3





 98.3  98.1 98.3


 


Pulse 68  61 63


 


Resp 18  18 18


 


B/P 148/89  140/74 149/84


 


Pulse Ox 94  95 95


 


O2 Delivery Room Air Room Air Room Air Room Air


 


    





    





 4/10/17 4/10/17 4/10/17 4/10/17





 21:02 21:45 21:53 23:29


 


Temp  98.1  98.0





  98.1  98.0


 


Pulse  58  70


 


Resp  18 18 18


 


B/P  122/57  149/103


 


Pulse Ox  98  100


 


O2 Delivery Room Air Room Air Room Air Room Air


 


    





    





 4/10/17 4/10/17 4/11/17 4/11/17





 23:33 23:34 00:38 02:20


 


Resp 18 18 18 18


 


O2 Delivery Room Air Room Air Room Air Room Air





 4/11/17 4/11/17 4/11/17 4/11/17





 03:09 03:09 07:00 08:46


 


Temp  98.1 98.2 





  98.1 98.2 


 


Pulse  67 55 


 


Resp 18 18 20 


 


B/P  103/51 125/70 


 


Pulse Ox  95 96 


 


O2 Delivery Room Air Room Air Room Air Room Air


 


    





    





 4/11/17 4/11/17  





 09:30 11:00  


 


Temp  98.1  





  98.1  


 


Pulse  61  


 


Resp  20  


 


B/P  130/62  


 


Pulse Ox  96  


 


O2 Delivery Room Air Room Air  














 Intake and Output   


 


 4/10/17 4/10/17 4/11/17





 15:00 23:00 07:00


 


Intake Total  600 ml 480 ml


 


Output Total  0 ml 1150 ml


 


Balance  600 ml -670 ml














MITCHEL LAUGHLIN III DO Apr 11, 2017 13:43

## 2017-04-11 NOTE — CONS
DATE OF CONSULTATION:  04/10/2017



LOCATION:  The patient is in room 432.



HISTORY OF PRESENT ILLNESS:  The patient is a very pleasant 62-year-old white

male who was transferred over from [place] with a right proximal ureteral

stone.  The patient first started becoming symptomatic this morning with right

flank and abdominal pain.  The patient with no prior history of any stones.  The

patient continues to have right flank and abdominal pain.  Past medical history

is significant for diabetes and hypertension.  The patient takes metformin for

his diabetes, but has not been taking any medicines for his hypertension.  He

has no known drug allergies.  He has had previous orthopedic operations in the

past.  No urologic operations.  The patient's white count is 12.0, creatinine is

1.4.  The patient's CT abdomen and pelvis show a 9 x 5 mm obstructing calculus

in the right proximal ureter with moderate perinephric edema and a

nonobstructing left intrarenal calculus measuring about 10 mm.



PHYSICAL EXAMINATION:

ABDOMEN:  The patient with tenderness in the right flank and right upper and

middle abdomen, otherwise soft, nontender in the rest of the abdomen.

GENITOURINARY:  Testes are descended bilaterally.  No inguinal hernias.  Phallus

within normal limits.

RECTAL:  Good sphincter tone.  Prostate smooth, nontender, without nodules,

overall size 25 grams.



PLAN:  I talked with the patient concerning his right ureteral stone and we

discussed the options, alternatives, benefits, risks and possible complications

of medical expulsive therapy versus surgical intervention with cystoscopy, right

retrograde pyelogram, and right ureteral stent placement to get him

unobstructed.  He understands this and does wish to proceed with operation, we

will therefore proceed accordingly.  I certainly appreciate being allowed to

participate in this patient's care.

 



______________________________

ANGELES MCELROY MD



DR:  DEANNA/gordon  JOB#:  [id number] / [id number]

DD:  04/10/2017 15:57  DT:  04/11/2017 06:45

## 2017-04-11 NOTE — PDOC
PROGRESS NOTES


Subjective


Subjective


Pt. feeling ok





Objective


Objective





 Vital Signs








  Date Time  Temp Pulse Resp B/P Pulse Ox O2 Delivery O2 Flow Rate FiO2


 


4/11/17 07:00 98.2 55 20 125/70 96 Room Air  





 98.2       


 


4/10/17 18:43       10.0 














 Intake and Output 


 


 4/11/17





 07:00


 


Intake Total 1080 ml


 


Output Total 1150 ml


 


Balance -70 ml


 


 


 


Intake Oral 480 ml


 


IV Total 600 ml


 


Output Urine Total 1150 ml


 


Estimated Blood Loss 0 ml











Physical Exam


Physical Exam


alston in place


urine clear





Assessment


Assessment


Alston removed


Home today


Follow up 1-2 weeks


Probable ESWL in 2-3 weeks





Comment


Review of Relevant


I have reviewed the following items oseas (where applicable) has been applied.


Labs





Laboratory Tests








Test


  4/10/17


16:50 4/11/17


06:50


 


Glucose (Fingerstick)


  125mg/dL


(70-99) 


 


 


White Blood Count


  


  10.9x10^3/uL


(4.0-11.0)


 


Red Blood Count


  


  4.66x10^6/uL


(4.30-5.70)


 


Hemoglobin


  


  13.9g/dL


(13.0-17.5)


 


Hematocrit


  


  41.9%


(39.0-53.0)


 


Mean Corpuscular Volume  90fL () 


 


Mean Corpuscular Hemoglobin  30pg (25-35) 


 


Mean Corpuscular Hemoglobin


Concent 


  33g/dL (31-37) 


 


 


Red Cell Distribution Width


  


  13.3%


(11.5-14.5)


 


Platelet Count


  


  192x10^3/uL


(140-400)


 


Sodium Level


  


  141mmol/L


(136-145)


 


Potassium Level


  


  4.1mmol/L


(3.5-5.1)


 


Chloride Level


  


  106mmol/L


()


 


Carbon Dioxide Level


  


  24mmol/L


(21-32)


 


Anion Gap  11 (6-14) 


 


Blood Urea Nitrogen  18mg/dL (8-26) 


 


Creatinine


  


  1.2mg/dL


(0.7-1.3)


 


Estimated GFR


(Cockcroft-Gault) 


  61.0 


 


 


Glucose Level


  


  161mg/dL


(70-99)


 


Calcium Level


  


  8.2mg/dL


(8.5-10.1)








Laboratory Tests








Test


  4/10/17


16:50 4/11/17


06:50


 


Glucose (Fingerstick)


  125mg/dL


(70-99) 


 


 


White Blood Count


  


  10.9x10^3/uL


(4.0-11.0)


 


Red Blood Count


  


  4.66x10^6/uL


(4.30-5.70)


 


Hemoglobin


  


  13.9g/dL


(13.0-17.5)


 


Hematocrit


  


  41.9%


(39.0-53.0)


 


Mean Corpuscular Volume  90fL () 


 


Mean Corpuscular Hemoglobin  30pg (25-35) 


 


Mean Corpuscular Hemoglobin


Concent 


  33g/dL (31-37) 


 


 


Red Cell Distribution Width


  


  13.3%


(11.5-14.5)


 


Platelet Count


  


  192x10^3/uL


(140-400)


 


Sodium Level


  


  141mmol/L


(136-145)


 


Potassium Level


  


  4.1mmol/L


(3.5-5.1)


 


Chloride Level


  


  106mmol/L


()


 


Carbon Dioxide Level


  


  24mmol/L


(21-32)


 


Anion Gap  11 (6-14) 


 


Blood Urea Nitrogen  18mg/dL (8-26) 


 


Creatinine


  


  1.2mg/dL


(0.7-1.3)


 


Estimated GFR


(Cockcroft-Gault) 


  61.0 


 


 


Glucose Level


  


  161mg/dL


(70-99)


 


Calcium Level


  


  8.2mg/dL


(8.5-10.1)








Medications





 Current Medications


Ondansetron HCl 4 mg 4 mg STK-MED ONCE .ROUTE ;  Start 4/10/17 at 16:26;  Stop 4

/10/17 at 16:27;  Status DC


Propofol (Diprivan) 20 ml @ As Directed STK-MED ONCE IV ;  Start 4/10/17 at 16:

26;  Stop 4/10/17 at 16:27;  Status DC


Lidocaine HCl 5 ml 5 ml STK-MED ONCE .ROUTE ;  Start 4/10/17 at 16:26;  Stop 4/

10/17 at 16:27;  Status DC


Cefazolin Sodium/ Dextrose (Ancef 2gm Premix) 50 ml @ As Directed STK-MED ONCE 

IV ;  Start 4/10/17 at 16:30;  Stop 4/10/17 at 16:31;  Status DC


Lidocaine HCl (Glydo (Lidocaine) Jelly) 6 hortensia STK-MED ONCE .ROUTE  Last 

administered on 4/10/17at 18:02;  Start 4/10/17 at 16:43;  Stop 4/10/17 at 16:44

;  Status DC


Iohexol (Omnipaque 300 Mg/ml) 50 ml STK-MED ONCE .ROUTE  Last administered on 4/

10/17at 17:17;  Start 4/10/17 at 16:43;  Stop 4/10/17 at 16:44;  Status DC


Lidocaine HCl 6 hortensia 6 hortensia STK-MED ONCE .ROUTE  Last administered on 4/10/17at 18

:02;  Start 4/10/17 at 16:43;  Stop 4/10/17 at 16:44;  Status DC


Cefazolin Sodium/ Dextrose (Ancef 2gm Premix) 50 ml @  100 mls/hr 1X  ONCE IV  

Last administered on 4/10/17at 17:21;  Start 4/10/17 at 16:45;  Stop 4/10/17 at 

17:14;  Status DC


Fentanyl Citrate (Fentanyl 2ml Vial) 100 mcg STK-MED ONCE .ROUTE ;  Start 4/10/

17 at 16:52;  Stop 4/10/17 at 16:53;  Status DC


Dexamethasone Sodium Phosphate (Decadron) 20 mg STK-MED ONCE .ROUTE ;  Start 4/

10/17 at 17:35;  Stop 4/10/17 at 17:36;  Status DC


Famotidine (Pepcid) 20 mg STK-MED ONCE .ROUTE ;  Start 4/10/17 at 17:35;  Stop 4

/10/17 at 17:36;  Status DC


Ondansetron HCl (Zofran) 4 mg STK-MED ONCE .ROUTE ;  Start 4/10/17 at 17:35;  

Stop 4/10/17 at 17:36;  Status DC


Ondansetron HCl (Zofran) 4 mg PRN Q6HRS  PRN IV NAUSEA/VOMITING;  Start 4/10/17 

at 18:00;  Stop 4/11/17 at 17:59


Fentanyl Citrate (Fentanyl 2ml Vial) 25 mcg PRN Q5MIN  PRN IV MILD PAIN;  Start 

4/10/17 at 18:00;  Stop 4/11/17 at 17:59


Fentanyl Citrate (Fentanyl 2ml Vial) 50 mcg PRN Q5MIN  PRN IV MODERATE PAIN 

Last administered on 4/10/17at 21:02;  Start 4/10/17 at 18:00;  Stop 4/11/17 at 

17:59


Morphine Sulfate 1 mg 1 mg PRN Q10MIN  PRN IV SEVERE PAIN;  Start 4/10/17 at 18:

00;  Stop 4/11/17 at 17:59


Lactated Ringer's (Iv Lactated Ringers) 1,000 ml @  0 mls/hr Q0M IV ;  Start 4/

10/17 at 17:48;  Stop 4/11/17 at 05:47;  Status DC


Lidocaine HCl 2 ml PRN 1X  PRN ID PRIOR TO IV START;  Start 4/10/17 at 18:00;  

Stop 4/11/17 at 17:59


Hydromorphone HCl (Dilaudid) 0.5 mg PRN Q10MIN  PRN IV SEV PAIN, Second choice;

  Start 4/10/17 at 18:00;  Stop 4/11/17 at 17:59


Prochlorperazine Edisylate (Compazine) 5 mg PACU PRN  PRN IV NAUSEA, MRX1;  

Start 4/10/17 at 18:00;  Stop 4/11/17 at 17:59


Sevoflurane (Ultane) 60 ml STK-MED ONCE IH ;  Start 4/10/17 at 18:05;  Stop 4/10

/17 at 18:06;  Status DC


Sevoflurane (Ultane) 15 ml STK-MED ONCE IH ;  Start 4/10/17 at 18:05;  Stop 4/10

/17 at 18:06;  Status DC


Sevoflurane 30 ml 30 ml STK-MED ONCE IH ;  Start 4/10/17 at 18:05;  Stop 4/10/

17 at 18:06;  Status DC


Cefazolin Sodium/ Sodium Chloride (Ancef/Iv Sodium Chloride 0.9% 50ml) 50 ml @  

100 mls/hr Q8H IV  Last administered on 4/11/17at 01:00;  Start 4/11/17 at 01:00


Pneumococcal Polyvalent Vaccine (Do NOT chart on this placeholder) 1 each PRN 

1X  PRN MC SEE COMMENTS;  Start 4/10/17 at 18:45;  Status UNV


Pneumococcal Polyvalent Vaccine (Pneumovax 23) 0.5 ml ONCE ONCE VAX IM ;  Start 

4/11/17 at 09:00;  Stop 4/11/17 at 09:01


Morphine Sulfate 2 mg PRN Q2HR  PRN IV PAIN;  Start 4/10/17 at 22:30


Morphine Sulfate 4 mg PRN Q2HR  PRN IV PAIN Last administered on 4/11/17at 02:20

;  Start 4/10/17 at 22:30


Acetaminophen/ Hydrocodone Bitart (Lortab 5/325) 1 tab PRN Q4HRS  PRN PO PAIN 

Last administered on 4/10/17at 23:33;  Start 4/10/17 at 22:30


Docusate Sodium 100 mg 100 mg BID PO  Last administered on 4/10/17at 23:33;  

Start 4/10/17 at 22:30


Sodium Chloride (Iv Sodium Chloride 0.9% 1000ml Bag) 1,000 ml @  75 mls/hr 

[id number] IV  Last administered on 4/10/17at 23:35;  Start 4/10/17 at 22:30





Active Scripts


Active


Reported


Metformin Hcl 500 Mg Tablet 1 Tab PO BID


Vitals/I & O





 Vital Sign - Last 24 Hours








 4/10/17 4/10/17 4/10/17 4/10/17





 16:00 16:00 16:30 18:12


 


Temp 97.4  97.8 





 97.4  97.8 


 


Pulse 73  57 


 


Resp 18  20 


 


B/P 121/87  158/91 


 


Pulse Ox 98  97 


 


O2 Delivery Room Air Room Air Room Air Mask


 


O2 Flow Rate    10


 


    





    





 4/10/17 4/10/17 4/10/17 4/10/17





 18:12 18:27 18:36 18:40


 


Temp 97.4   





 97.4   


 


Pulse 83 62  


 


Resp 18 18  


 


B/P 154/81 149/78  


 


Pulse Ox 95 100 100 


 


O2 Delivery Simple Mask Simple Mask Simple Mask Room Air


 


O2 Flow Rate 10 10 10.0 


 


    





    





 4/10/17 4/10/17 4/10/17 4/10/17





 18:42 18:43 19:15 19:30


 


Temp 99.1  98.1 98.3





 99.1  98.1 98.3


 


Pulse 62  95 72


 


Resp 18  18 20


 


B/P 157/73  147/76 157/71


 


Pulse Ox 98 100 95 94


 


O2 Delivery Room Air Room Air Room Air Room Air


 


O2 Flow Rate  10.0  


 


    





    





 4/10/17 4/10/17 4/10/17 4/10/17





 19:45 20:00 20:15 20:45


 


Temp 98.3  98.1 98.3





 98.3  98.1 98.3


 


Pulse 68  61 63


 


Resp 18  18 18


 


B/P 148/89  140/74 149/84


 


Pulse Ox 94  95 95


 


O2 Delivery Room Air Room Air Room Air Room Air


 


    





    





 4/10/17 4/10/17 4/10/17 4/10/17





 21:02 21:45 21:53 23:29


 


Temp  98.1  98.0





  98.1  98.0


 


Pulse  58  70


 


Resp  18 18 18


 


B/P  122/57  149/103


 


Pulse Ox  98  100


 


O2 Delivery Room Air Room Air Room Air Room Air


 


    





    





 4/10/17 4/10/17 4/11/17 4/11/17





 23:33 23:34 00:38 02:20


 


Resp 18 18 18 18


 


O2 Delivery Room Air Room Air Room Air Room Air





 4/11/17 4/11/17 4/11/17 





 03:09 03:09 07:00 


 


Temp  98.1 98.2 





  98.1 98.2 


 


Pulse  67 55 


 


Resp 18 18 20 


 


B/P  103/51 125/70 


 


Pulse Ox  95 96 


 


O2 Delivery Room Air Room Air Room Air 














 Intake and Output   


 


 4/10/17 4/10/17 4/11/17





 15:00 23:00 07:00


 


Intake Total  600 ml 480 ml


 


Output Total  0 ml 1150 ml


 


Balance  600 ml -670 ml














ANGELES MCELROY MD Apr 11, 2017 08:39

## 2017-04-12 NOTE — DS
DATE OF DISCHARGE:  04/11/2017



ADMISSION DIAGNOSES:  Kidney stone.



DISCHARGE DIAGNOSIS:  Postop right ureteral stent placement.



HOSPITAL COURSE:  The patient is a pleasant 62-year-old male, who presented with

a 9-mm kidney stone.  He was admitted.  We consulted Dr. Zhang.  He was taken

for a ureteral stent.  Post-procedure, he is doing better.  We plan to discharge

with close outpatient followup.



DISPOSITION:  Home.



ACTIVITY:  As tolerated.



DIET:  Low sodium.



MEDICATIONS:  Please see the MRAD.



TOTAL TIME ON DISCHARGE:  33 minutes.

 



______________________________

MITCHEL LAUGHLIN DO



DR:  BRYSON/gordon  JOB#:  [id number] / [id number]

DD:  04/12/2017 14:23  DT:  04/12/2017 20:08

## 2017-05-09 ENCOUNTER — HOSPITAL ENCOUNTER (OUTPATIENT)
Dept: HOSPITAL 61 - RAD | Age: 62
Discharge: HOME | End: 2017-05-09
Attending: UROLOGY
Payer: COMMERCIAL

## 2017-05-09 DIAGNOSIS — N20.0: Primary | ICD-10-CM

## 2017-05-09 DIAGNOSIS — R10.9: ICD-10-CM

## 2017-05-09 PROCEDURE — 74000: CPT

## 2017-05-09 NOTE — RAD
Indication: Right flank pain and right stent placement.



Time of exam 1335 hours.



Correlation is made with prior exam from 4/11/2017.



Right double-J nephroureteral stent remains in place. Calcific densities

overlying the lower poles of both kidneys appears stable. No calculi along the

course of the right stent are identified.



Impression: Stable KUB when compared with exam from one month earlier.

## 2017-05-13 ENCOUNTER — HOSPITAL ENCOUNTER (EMERGENCY)
Dept: HOSPITAL 63 - ER | Age: 62
Discharge: ANOTHER HOSPITAL | End: 2017-05-13
Payer: COMMERCIAL

## 2017-05-13 VITALS
DIASTOLIC BLOOD PRESSURE: 70 MMHG | SYSTOLIC BLOOD PRESSURE: 144 MMHG | DIASTOLIC BLOOD PRESSURE: 70 MMHG | SYSTOLIC BLOOD PRESSURE: 144 MMHG | SYSTOLIC BLOOD PRESSURE: 144 MMHG | DIASTOLIC BLOOD PRESSURE: 70 MMHG | SYSTOLIC BLOOD PRESSURE: 144 MMHG | DIASTOLIC BLOOD PRESSURE: 70 MMHG

## 2017-05-13 VITALS — SYSTOLIC BLOOD PRESSURE: 148 MMHG | DIASTOLIC BLOOD PRESSURE: 82 MMHG

## 2017-05-13 VITALS — HEIGHT: 78 IN | BODY MASS INDEX: 30.89 KG/M2 | WEIGHT: 267 LBS

## 2017-05-13 DIAGNOSIS — N20.0: Primary | ICD-10-CM

## 2017-05-13 DIAGNOSIS — R79.89: ICD-10-CM

## 2017-05-13 DIAGNOSIS — I10: ICD-10-CM

## 2017-05-13 DIAGNOSIS — N17.9: ICD-10-CM

## 2017-05-13 DIAGNOSIS — N23: ICD-10-CM

## 2017-05-13 DIAGNOSIS — E11.9: ICD-10-CM

## 2017-05-13 LAB
ALBUMIN SERPL-MCNC: 3.8 G/DL (ref 3.4–5)
ALBUMIN/GLOB SERPL: 1.3 {RATIO} (ref 1–1.7)
ALP SERPL-CCNC: 84 U/L (ref 46–116)
ALT SERPL-CCNC: 30 U/L (ref 16–63)
ANION GAP SERPL CALC-SCNC: 11 MMOL/L (ref 6–14)
APTT PPP: YELLOW S
AST SERPL-CCNC: 17 U/L (ref 15–37)
BACTERIA #/AREA URNS HPF: 0 /HPF
BASOPHILS # BLD AUTO: 0.1 X10^3/UL (ref 0–0.2)
BASOPHILS NFR BLD: 1 % (ref 0–3)
BILIRUB SERPL-MCNC: 0.4 MG/DL (ref 0.2–1)
BILIRUB UR QL STRIP: (no result)
BUN/CREAT SERPL: 14 (ref 6–20)
CA-I SERPL ISE-MCNC: 24 MG/DL (ref 8–26)
CALCIUM SERPL-MCNC: 8.7 MG/DL (ref 8.5–10.1)
CHLORIDE SERPL-SCNC: 106 MMOL/L (ref 98–107)
CO2 SERPL-SCNC: 24 MMOL/L (ref 21–32)
CREAT SERPL-MCNC: 1.7 MG/DL (ref 0.7–1.3)
EOSINOPHIL NFR BLD: 0.2 X10^3/UL (ref 0–0.7)
EOSINOPHIL NFR BLD: 2 % (ref 0–3)
ERYTHROCYTE [DISTWIDTH] IN BLOOD BY AUTOMATED COUNT: 13.2 % (ref 11.5–14.5)
FIBRINOGEN PPP-MCNC: CLEAR MG/DL
GFR SERPLBLD BASED ON 1.73 SQ M-ARVRAT: 40.8 ML/MIN
GLOBULIN SER-MCNC: 2.9 G/DL (ref 2.2–3.8)
GLUCOSE SERPL-MCNC: 150 MG/DL (ref 70–99)
GLUCOSE UR STRIP-MCNC: (no result) MG/DL
HCT VFR BLD CALC: 43.2 % (ref 39–53)
HGB BLD-MCNC: 14.7 G/DL (ref 13–17.5)
LYMPHOCYTES # BLD: 2.1 X10^3/UL (ref 1–4.8)
LYMPHOCYTES NFR BLD AUTO: 21 % (ref 24–48)
MCH RBC QN AUTO: 30 PG (ref 25–35)
MCHC RBC AUTO-ENTMCNC: 34 G/DL (ref 31–37)
MCV RBC AUTO: 88 FL (ref 79–100)
MONO #: 1 X10^3/UL (ref 0–1.1)
MONOCYTES NFR BLD: 10 % (ref 0–9)
NEUT #: 6.6 X10^3UL (ref 1.8–7.7)
NEUTROPHILS NFR BLD AUTO: 66 % (ref 31–73)
NITRITE UR QL STRIP: (no result)
PLATELET # BLD AUTO: 187 X10^3/UL (ref 140–400)
POTASSIUM SERPL-SCNC: 3.9 MMOL/L (ref 3.5–5.1)
PROT SERPL-MCNC: 6.7 G/DL (ref 6.4–8.2)
RBC # BLD AUTO: 4.9 X10^6/UL (ref 4.3–5.7)
RBC #/AREA URNS HPF: 0 /HPF (ref 0–2)
SODIUM SERPL-SCNC: 141 MMOL/L (ref 136–145)
SP GR UR STRIP: 1.01
SQUAMOUS #/AREA URNS LPF: (no result) /LPF
UROBILINOGEN UR-MCNC: 0.2 MG/DL
WBC # BLD AUTO: 10 X10^3/UL (ref 4–11)
WBC #/AREA URNS HPF: (no result) /HPF (ref 0–4)

## 2017-05-13 PROCEDURE — 80053 COMPREHEN METABOLIC PANEL: CPT

## 2017-05-13 PROCEDURE — 96376 TX/PRO/DX INJ SAME DRUG ADON: CPT

## 2017-05-13 PROCEDURE — 85027 COMPLETE CBC AUTOMATED: CPT

## 2017-05-13 PROCEDURE — 99285 EMERGENCY DEPT VISIT HI MDM: CPT

## 2017-05-13 PROCEDURE — 96374 THER/PROPH/DIAG INJ IV PUSH: CPT

## 2017-05-13 PROCEDURE — 36415 COLL VENOUS BLD VENIPUNCTURE: CPT

## 2017-05-13 PROCEDURE — 96375 TX/PRO/DX INJ NEW DRUG ADDON: CPT

## 2017-05-13 PROCEDURE — 81001 URINALYSIS AUTO W/SCOPE: CPT

## 2017-05-13 PROCEDURE — 96361 HYDRATE IV INFUSION ADD-ON: CPT

## 2017-05-13 PROCEDURE — 74176 CT ABD & PELVIS W/O CONTRAST: CPT

## 2017-05-13 NOTE — RAD
Examination: CT of the abdomen pelvis without contrast. 

 

HISTORY 

History of severe right flank pain 

 

COMPARISON 

04/10/2017. 

 

TECHNIQUE 

Axial CT images of the abdomen pelvis were performed without contrast. 

Coronal sagittal reformats were performed. 

Exposure: 

One or more of the following dose reduction technique were utilized for 

this examination: 

1. Automated exposure control. 

2.Adjustment of MA and /or KV according to patient size. 

3. Use of iterative reconstruction technique. 

 

Findings; 

 

The visualized bibasilar lungs grossly appears unremarkable. No evidence 

of free air identified in the abdomen. The evaluation of the solid organs 

is limited lack of IV contrast. The evaluation of the bowel is limited 

lack of oral contrast. 

The visualized liver, spleen, adrenals grossly appears unremarkable. The 

gallbladder is mildly distended. The stomach is mildly distended. The 

visualized pancreas grossly appears unremarkable. The small bowel is 

nondilated. Feces and gas noted throughout the colon. 

Few sigmoid colon diverticulosis identified. 

Moderate right-sided hydronephrosis again identified. There is a 8.5 

millimeter calculus identified in the proximal right ureter similar to 

prior exam. There is moderate inflammatory fat stranding identified about 

the right kidney. There is moderate inflammatory fat stranding identified 

along the right ureter. 

Urinary bladder is minimally distended. There is minimal stranding 

identified about the urinary bladder. 

Intrarenal collecting system calculus identified in the left kidney in the

inferior pole measuring 8.5 millimeters again identified. 

Moderate degenerative changes identified at L4-L5, L5-S1 vertebral level. 

Spondylolysis identified at L5 vertebral level with mild anterior 

listhesis of L5 on S1. 

 

 

IMPRESSION 

- Moderate right-sided hydronephrosis is again identified with a 8.5 

millimeter calculus in the proximal right ureter grossly similar to prior 

exam. There is moderate inflammatory fat stranding identified about the 

right kidney and along the right ureter could be due to back pressure or 

due to pyelonephritis. Correlate with lab values.

- 8.5 millimeter intrarenal collecting system calculus identified in the 

left kidney grossly similar to prior exam.

- Mild stranding identified about the urinary bladder with minimal 

distention the urinary bladder, probably nondistention. Correlate for 

cystitis.

 

 

 

Electronically signed by: Emil Blanco (May 13, 2017 21:20:49)

## 2017-05-13 NOTE — PHYS DOC
Past History


Past Medical History:  Diabetes, Hypertension, Kidney Stones


Past Surgical History:  Other


Alcohol Use:  None


Drug Use:  None





Adult General


Chief Complaint


Chief Complaint:  FLANK PAIN





HPI


HPI





Patient is a 62-year-old gentleman who presents here today complaining of right 

flank pain. Patient reports she was diagnosed with a kidney stone in his 

proximal ureter that was approximately 8-9 mm in size. Patient reports that he 

was evaluated by Dr. Zhang and had lithotripsy done as well as a stent without 

any successful resolution to his stone. Patient reports that Dr. Zhang 

informed him that the stented pushed the stone back into the kidney and he told 

that it may or may not come flying out. He advised that if he starts having 

pain that is returning that he should return to the ER to assist with the pain 

and discomfort. Patient reports earlier today he started feeling the same type 

of pain that he had. He reports the pain is in his right flank radiating to his 

right abdominal area. Patient has any fevers shakes chills nausea vomiting or 

diarrhea at this time. Patient presents today requesting 'that we go in there 

and remove it permanently. "  





Patient's physical exam is remarkable for tenderness to palpation to his right 

flank and right abdominal area. Patient does not appear to be in any severe 

distress at this time other than the discomfort that he is having. Patient's 

abdomen is soft nondistended no rebound or guarding. Normal active bowel 

sounds. Patient does not present with any signs or symptoms of be concerning 

for an acute surgical abdomen.





Patient's ER workup was significant for a CT scan that revealed moderate right-

sided hydronephrosis with an 8.5 mm Was in the right proximal ureter is grossly 

similar to the prior exam. There is moderate inflammatory fat stranding 

identified around the right kidney and along the right ureter which could be 

due to back pressure or due to pyelonephritis.





Patient received 2 rounds of Dilaudid IV in the ER without any significant 

resolution in his discomfort. Patient's pain keeps recurring. Patient was given 

IV fluids in the ER. I discussed with the patient the CT reports and his 

options. Patient is currently requesting that we assist him with admission to 

the hospital in order to have the stone removed once and for all.





Plan


#1 intractable abdominal pain secondary to renal colic. Patient received 

several rounds of pain medicines here without any significant resolution in his 

discomfort. Patient is to be transferred to [facility] for admission 

for further management of the of his discomfort and for consultation with Dr. Zhang to assist with definitive care of the stone. Patient is otherwise 

clinically and hemodynamically stable. There is no evidence of 

pilohydronephrosis. Case was discussed with Dr. coffey she is in agreement with 

the plan to transfer to [facility] for admission.





Review of Systems


Review of Systems





Constitutional: Denies fever or chills []


Eyes: Denies change in visual acuity, redness, or eye pain []


HENT: Denies nasal congestion or sore throat []


Respiratory: Denies cough or shortness of breath []


Cardiovascular: No additional information not addressed in HPI []


GI: Denies abdominal pain, nausea, vomiting, bloody stools or diarrhea []


: Denies dysuria or hematuria []


Musculoskeletal: Denies back pain or joint pain []


Integument: Denies rash or skin lesions []


Neurologic: Denies headache, focal weakness or sensory changes []


Endocrine: Denies polyuria or polydipsia []





Current Medications


Current Medications





Current Medications








 Medications


  (Trade)  Dose


 Ordered  Sig/Radha  Start Time


 Stop Time Status Last Admin


Dose Admin


 


 Hydromorphone HCl


  (Dilaudid)  1 mg  1X  ONCE  5/13/17 23:00


 5/13/17 23:01  5/13/17 22:37


1 MG


 


 Ketorolac


 Tromethamine


  (Toradol)  15 mg  1X  ONCE  5/13/17 21:15


 5/13/17 21:16 DC 5/13/17 21:24


15 MG


 


 Ondansetron HCl


  (Zofran)  4 mg  1X  ONCE  5/13/17 21:15


 5/13/17 21:16 DC 5/13/17 21:20


4 MG


 


 Sodium Chloride  1,000 ml @ 


 1,000 mls/hr  1X  ONCE  5/13/17 21:15


 5/13/17 22:14 DC 5/13/17 21:16


1,000 MLS/HR











Allergies


Allergies





Allergies








Coded Allergies Type Severity Reaction Last Updated Verified


 


  No Known Drug Allergies    4/10/17 No











Physical Exam


Physical Exam





Constitutional: Well developed, well nourished, no acute distress, non-toxic 

appearance. []


HENT: Normocephalic, atraumatic, bilateral external ears normal, oropharynx 

moist, no oral exudates, nose normal. []


Eyes: PERRLA, EOMI, conjunctiva normal, no discharge. [] 


Neck: Normal range of motion, no tenderness, supple, no stridor. [] 


Cardiovascular:Heart rate regular rhythm, no murmur []


Lungs & Thorax:  Bilateral breath sounds clear to auscultation []


Abdomen: Bowel sounds normal, soft, no tenderness, no masses, no pulsatile 

masses. [] 


Skin: Warm, dry, no erythema, no rash. [] 


Back: No tenderness, no CVA tenderness. [] 


Extremities: No tenderness, no cyanosis, no clubbing, ROM intact, no edema. [] 


Neurologic: Alert and oriented X 3, normal motor function, normal sensory 

function, no focal deficits noted. []


Psychologic: Affect normal, judgement normal, mood normal. []





Current Patient Data


Vital Signs





 Vital Signs








  Date Time  Temp Pulse Resp B/P (MAP) Pulse Ox O2 Delivery O2 Flow Rate FiO2


 


5/13/17 21:55  68 18 144/70 (94) 95 Room Air  


 


5/13/17 20:40 99.1       








Lab Results





 Laboratory Tests








Test


  5/13/17


21:10


 


White Blood Count


  10.0 x10^3/uL


(4.0-11.0)


 


Red Blood Count


  4.90 x10^6/uL


(4.30-5.70)


 


Hemoglobin


  14.7 g/dL


(13.0-17.5)


 


Hematocrit


  43.2 %


(39.0-53.0)


 


Mean Corpuscular Volume


  88 fL ()


 


 


Mean Corpuscular Hemoglobin 30 pg (25-35)  


 


Mean Corpuscular Hemoglobin


Concent 34 g/dL


(31-37)


 


Red Cell Distribution Width


  13.2 %


(11.5-14.5)


 


Platelet Count


  187 x10^3/uL


(140-400)


 


Neutrophils (%) (Auto) 66 % (31-73)  


 


Lymphocytes (%) (Auto) 21 % (24-48)  L


 


Monocytes (%) (Auto) 10 % (0-9)  H


 


Eosinophils (%) (Auto) 2 % (0-3)  


 


Basophils (%) (Auto) 1 % (0-3)  


 


Neutrophils # (Auto)


  6.6 x10^3uL


(1.8-7.7)


 


Lymphocytes # (Auto)


  2.1 x10^3/uL


(1.0-4.8)


 


Monocytes # (Auto)


  1.0 x10^3/uL


(0.0-1.1)


 


Eosinophils # (Auto)


  0.2 x10^3/uL


(0.0-0.7)


 


Basophils # (Auto)


  0.1 x10^3/uL


(0.0-0.2)


 


Urine Collection Type Unknown  


 


Urine Color Yellow  


 


Urine Clarity Clear  


 


Urine pH 6.0  


 


Urine Specific Gravity 1.015  


 


Urine Protein


  Neg


(NEG-TRACE)


 


Urine Glucose (UA)


  Neg mg/dL


(NEG)


 


Urine Ketones (Stick)


  Neg mg/dL


(NEG)


 


Urine Blood Neg (NEG)  


 


Urine Nitrite Neg (NEG)  


 


Urine Bilirubin Neg (NEG)  


 


Urine Urobilinogen Dipstick


  0.2 mg/dL (0.2


mg/dL)


 


Urine Leukocyte Esterase Neg (NEG)  


 


Urine RBC 0 /HPF (0-2)  


 


Urine WBC


  Occ /HPF (0-4)


 


 


Urine Squamous Epithelial


Cells Occ /LPF  


 


 


Urine Bacteria


  0 /HPF (0-FEW)


 


 


Sodium Level


  141 mmol/L


(136-145)


 


Potassium Level


  3.9 mmol/L


(3.5-5.1)


 


Chloride Level


  106 mmol/L


()


 


Carbon Dioxide Level


  24 mmol/L


(21-32)


 


Anion Gap 11 (6-14)  


 


Blood Urea Nitrogen


  24 mg/dL


(8-26)


 


Creatinine


  1.7 mg/dL


(0.7-1.3)  H


 


Estimated GFR


(Cockcroft-Gault) 40.8  


 


 


BUN/Creatinine Ratio 14 (6-20)  


 


Glucose Level


  150 mg/dL


(70-99)  H


 


Calcium Level


  8.7 mg/dL


(8.5-10.1)


 


Total Bilirubin


  0.4 mg/dL


(0.2-1.0)


 


Aspartate Amino Transferase


(AST) 17 U/L (15-37)


 


 


Alanine Aminotransferase (ALT)


  30 U/L (16-63)


 


 


Alkaline Phosphatase


  84 U/L


()


 


Total Protein


  6.7 g/dL


(6.4-8.2)


 


Albumin


  3.8 g/dL


(3.4-5.0)


 


Albumin/Globulin Ratio 1.3 (1.0-1.7)  











EKG


EKG


[]





Radiology/Procedures


Radiology/Procedures


[]





Course & Med Decision Making


Course & Med Decision Making


Pertinent Labs and Imaging studies reviewed. (See chart for details)





[]





Dragon Disclaimer


Dragon Disclaimer


This chart was dictated in whole or in part using Voice Recognition software in 

a busy, high-work load, and often noisy Emergency Department environment.  It 

may contain unintended and wholly unrecognized errors or omissions.





Departure


Departure:


Impression:  


 Primary Impression:  


 Kidney stone


 Additional Impressions:  


 Intractable abdominal pain


 Renal colic on right side


 Elevated serum creatinine


 Acute renal injury


Disposition:  05 XFER OTHER (to [facility] for admission as inpatient)


Condition:  IMPROVED


Referrals:  


CITLALY MARCIAL MD (PCP)





Problem Qualifiers











ISA ESCAMILLA MD May 13, 2017 22:46

## 2017-05-14 ENCOUNTER — HOSPITAL ENCOUNTER (INPATIENT)
Dept: HOSPITAL 61 - 4 NORTH | Age: 62
LOS: 2 days | Discharge: HOME | DRG: 670 | End: 2017-05-16
Attending: INTERNAL MEDICINE | Admitting: INTERNAL MEDICINE
Payer: COMMERCIAL

## 2017-05-14 VITALS — SYSTOLIC BLOOD PRESSURE: 135 MMHG | DIASTOLIC BLOOD PRESSURE: 75 MMHG

## 2017-05-14 VITALS — DIASTOLIC BLOOD PRESSURE: 67 MMHG | SYSTOLIC BLOOD PRESSURE: 132 MMHG

## 2017-05-14 VITALS — HEIGHT: 71 IN | BODY MASS INDEX: 38.41 KG/M2 | WEIGHT: 274.38 LBS

## 2017-05-14 VITALS — DIASTOLIC BLOOD PRESSURE: 73 MMHG | SYSTOLIC BLOOD PRESSURE: 154 MMHG

## 2017-05-14 VITALS — SYSTOLIC BLOOD PRESSURE: 132 MMHG | DIASTOLIC BLOOD PRESSURE: 76 MMHG

## 2017-05-14 VITALS — SYSTOLIC BLOOD PRESSURE: 150 MMHG | DIASTOLIC BLOOD PRESSURE: 88 MMHG

## 2017-05-14 VITALS — SYSTOLIC BLOOD PRESSURE: 141 MMHG | DIASTOLIC BLOOD PRESSURE: 88 MMHG

## 2017-05-14 VITALS — SYSTOLIC BLOOD PRESSURE: 154 MMHG | DIASTOLIC BLOOD PRESSURE: 73 MMHG

## 2017-05-14 DIAGNOSIS — I10: ICD-10-CM

## 2017-05-14 DIAGNOSIS — E11.9: ICD-10-CM

## 2017-05-14 DIAGNOSIS — N20.2: Primary | ICD-10-CM

## 2017-05-14 DIAGNOSIS — E66.9: ICD-10-CM

## 2017-05-14 PROCEDURE — 74420 UROGRAPHY RTRGR +-KUB: CPT

## 2017-05-14 PROCEDURE — 82365 CALCULUS SPECTROSCOPY: CPT

## 2017-05-14 PROCEDURE — 82962 GLUCOSE BLOOD TEST: CPT

## 2017-05-14 PROCEDURE — C1769 GUIDE WIRE: HCPCS

## 2017-05-14 RX ADMIN — ONDANSETRON PRN MG: 2 INJECTION INTRAMUSCULAR; INTRAVENOUS at 08:28

## 2017-05-14 RX ADMIN — BACITRACIN SCH MLS/HR: 5000 INJECTION, POWDER, FOR SOLUTION INTRAMUSCULAR at 16:49

## 2017-05-14 RX ADMIN — BACITRACIN SCH MLS/HR: 5000 INJECTION, POWDER, FOR SOLUTION INTRAMUSCULAR at 08:35

## 2017-05-14 RX ADMIN — MORPHINE SULFATE PRN MG: 4 INJECTION, SOLUTION INTRAMUSCULAR; INTRAVENOUS at 04:29

## 2017-05-14 RX ADMIN — BACITRACIN SCH MLS/HR: 5000 INJECTION, POWDER, FOR SOLUTION INTRAMUSCULAR at 01:10

## 2017-05-14 RX ADMIN — INSULIN ASPART SCH UNITS: 100 INJECTION, SOLUTION INTRAVENOUS; SUBCUTANEOUS at 16:50

## 2017-05-14 RX ADMIN — MORPHINE SULFATE PRN MG: 4 INJECTION, SOLUTION INTRAMUSCULAR; INTRAVENOUS at 08:27

## 2017-05-14 RX ADMIN — ONDANSETRON PRN MG: 2 INJECTION INTRAMUSCULAR; INTRAVENOUS at 01:11

## 2017-05-14 RX ADMIN — TAMSULOSIN HYDROCHLORIDE SCH MG: 0.4 CAPSULE ORAL at 14:30

## 2017-05-14 NOTE — HP
ADMIT DATE:  05/14/2017



CHIEF COMPLAINT:  Urolithiasis.



HOSPITAL COURSE:  The patient is a 62-year-old  gentleman with a recent

history of urolithiasis on the right, which was addressed by stent placement and

attempted lithotripsy, which unfortunately was unsuccessful.  A ureteral stent

was pulled just 4 days ago.  Unfortunately, the patient had recurrent symptoms

as with his first renal colic, and indeed, CAT scan once again showed an

approximately 8.5 mm stone in the proximal right ureter.  The patient had

initially presented to [facility] Emergency Room, but was transferred to

[facility] for further management and care under his urologist,

Dr. Zhang.



PAST MEDICAL HISTORY:  Diabetes, hypertension, nephrolithiasis as above.



FAMILY HISTORY:  Other members with renal stones as well.



SOCIAL HISTORY:  He is a .  No toxic habits, never smoked.



ALLERGIES:  No known drug allergies.



HOME MEDICATIONS:  Reconciled with [facility].



REVIEW OF SYSTEMS:  Positive for right flank pain with radiation to the right

groin.  Appetite is poor, increased nausea with IV pain medications.  Denies any

hematuria.  Rest of organ system review is negative.



PHYSICAL EXAMINATION:

VITAL SIGNS:  From today show a blood pressure of 141/88, heart rate of 52,

respiratory rate at 18.  He is afebrile.

GENERAL:  This is a 62-year-old obese  gentleman, alert and oriented,

in mild distress from flank pain.

HEENT:  Shows no scleral icterus.

NECK:  Supple, without any lymphadenopathy.

LUNGS:  Clear to auscultation bilaterally.

HEART:  Has regular rate and rhythm.

ABDOMEN:  Massively obese, mild tenderness to palpation on the right, positive

flank pain to auscultation.

EXTREMITIES:  Show no edema.

SKIN:  Warm, soft and dry.



LABORATORY DATA:  CBC with a WBC of 10.9, hemoglobin 13.9, platelets of 192. 

Chemistries with a BUN and creatinine of 18 and 1.2.  Normal electrolytes. 

Glucose is well controlled.



ASSESSMENT AND PLAN:  The patient is a 62-year-old gentleman with renal stones,

unfortunately recurrent urolithiasis.  His urologist, Dr. Zhang, is consulted. 

For his pain regimen, we will try a low dose oxycodone as he appears very

sensitive to IV medications and narcotics in general.  Morphine will be

available as an emergency as well.  Antiemetics with Zofran and Reglan will be

added.



As I do not anticipate he will undergo any procedure today, we will give him ADA

diet for now, possible n.p.o. in a.m. pending plans by Dr. Zhang.  His

fingersticks will be monitored closely.  He confesses that he actually has not

even been taking his metformin at home, as he feels best when his blood sugars

in the 200s-300s.  He feels very jittery when it comes to ranges where he is

supposed to be i.e. less than 150.  I had a long discussion with him that this

is not acceptable and that he will have to get his body used to lower levels in

a stepwise fashion.  We will obtain hemoglobin A1c to assess his status

chronically.  The patient should be on an ACE inhibitor for renal protection

with diabetes.  Blood pressure itself is currently mildly elevated as well and

can be addressed at the same time.



We will hold off anticoagulation prophylaxis for now, say with a mechanical

device.

 



______________________________

JOSLYN SIEGEL MD



DR:  UR/nts  JOB#:  [id number] / [id number]

DD:  05/14/2017 13:58  DT:  05/14/2017 15:58



CITLALY Bauer MD

[facility]

## 2017-05-15 VITALS — DIASTOLIC BLOOD PRESSURE: 77 MMHG | SYSTOLIC BLOOD PRESSURE: 163 MMHG

## 2017-05-15 VITALS — SYSTOLIC BLOOD PRESSURE: 133 MMHG | DIASTOLIC BLOOD PRESSURE: 56 MMHG

## 2017-05-15 VITALS — SYSTOLIC BLOOD PRESSURE: 172 MMHG | DIASTOLIC BLOOD PRESSURE: 90 MMHG

## 2017-05-15 VITALS — DIASTOLIC BLOOD PRESSURE: 98 MMHG | SYSTOLIC BLOOD PRESSURE: 122 MMHG

## 2017-05-15 VITALS — SYSTOLIC BLOOD PRESSURE: 146 MMHG | DIASTOLIC BLOOD PRESSURE: 76 MMHG

## 2017-05-15 VITALS — SYSTOLIC BLOOD PRESSURE: 124 MMHG | DIASTOLIC BLOOD PRESSURE: 70 MMHG

## 2017-05-15 VITALS — SYSTOLIC BLOOD PRESSURE: 135 MMHG | DIASTOLIC BLOOD PRESSURE: 72 MMHG

## 2017-05-15 VITALS — SYSTOLIC BLOOD PRESSURE: 126 MMHG | DIASTOLIC BLOOD PRESSURE: 77 MMHG

## 2017-05-15 PROCEDURE — BT1D1ZZ FLUOROSCOPY OF RIGHT KIDNEY, URETER AND BLADDER USING LOW OSMOLAR CONTRAST: ICD-10-PCS | Performed by: UROLOGY

## 2017-05-15 PROCEDURE — 0TC68ZZ EXTIRPATION OF MATTER FROM RIGHT URETER, VIA NATURAL OR ARTIFICIAL OPENING ENDOSCOPIC: ICD-10-PCS | Performed by: UROLOGY

## 2017-05-15 PROCEDURE — 0T768DZ DILATION OF RIGHT URETER WITH INTRALUMINAL DEVICE, VIA NATURAL OR ARTIFICIAL OPENING ENDOSCOPIC: ICD-10-PCS | Performed by: UROLOGY

## 2017-05-15 RX ADMIN — TAMSULOSIN HYDROCHLORIDE SCH MG: 0.4 CAPSULE ORAL at 09:36

## 2017-05-15 RX ADMIN — BACITRACIN SCH MLS/HR: 5000 INJECTION, POWDER, FOR SOLUTION INTRAMUSCULAR at 09:36

## 2017-05-15 RX ADMIN — INSULIN ASPART SCH UNITS: 100 INJECTION, SOLUTION INTRAVENOUS; SUBCUTANEOUS at 12:00

## 2017-05-15 RX ADMIN — BACITRACIN SCH MLS/HR: 5000 INJECTION, POWDER, FOR SOLUTION INTRAMUSCULAR at 16:36

## 2017-05-15 RX ADMIN — INSULIN ASPART SCH UNITS: 100 INJECTION, SOLUTION INTRAVENOUS; SUBCUTANEOUS at 08:00

## 2017-05-15 RX ADMIN — BACITRACIN SCH MLS/HR: 5000 INJECTION, POWDER, FOR SOLUTION INTRAMUSCULAR at 00:36

## 2017-05-15 RX ADMIN — INSULIN ASPART SCH UNITS: 100 INJECTION, SOLUTION INTRAVENOUS; SUBCUTANEOUS at 17:00

## 2017-05-15 NOTE — OP
DATE OF SURGERY:  05/15/2017



OPERATION:  Cystoscopy, right retrograde pyelogram, right ureteroscopy with

holmium laser lithotripsy and stone removal and right ureteral stent placement.



SURGEON:  Angeles Zhang MD



ANESTHESIA:  General.



PREOPERATIVE DIAGNOSIS:  Right ureteral stone.



POSTOPERATIVE DIAGNOSIS:  Right ureteral stone.



INDICATIONS:  The patient is a very pleasant 62-year-old white male with history

of 9 x 5 mm right ureteral stone originally presented with right renal colic and

underwent right ureteral stenting as the stone was in the right proximal ureter

and the stone had pushed back to the kidney and stone was initially treated with

ESWL with no significant break up, however, the stone had been displaced back

into the lateral aspect of the kidney and therefore, the stent was removed.  The

patient had been doing okay until 05/13/2017 when he started having right renal

colic again, was seen in the Emergency Room and found to have the stone back in

the right proximal ureter.  The patient has continued to have pain from the

right-sided stone.  I have discussed with the patient the options, alternatives,

benefits, risks and possible complications of cystoscopy, right retrograde

pyelogram, possible ureteroscopy, possible laser lithotripsy and right ureteral

stent placement.  He understands this and does wish to proceed ahead with

operation.



DESCRIPTION OF PROCEDURE:  After obtaining informed consent, the patient was

taken to operating room.  After an excellent general anesthetic, the patient was

placed in a dorsal lithotomy position.  Groin was prepped and draped in sterile

fashion.  The patient was preloaded with IV antibiotics.  Panendoscopy and

cystoscopy were then performed with the 30 and 70-degree lens and the 21-Gibraltarian

cystoscope sheath.  Penile urethra was found to be grossly normal.  The patient

noted to have a fairly tight stricture in the bulbar urethra; however, the

stricture was dilated up with the beak of the cystoscope under direct vision. 

External sphincter appeared intact.  Prostatic urethra showed some moderate

bilobar enlargement.  Bladder was entered and inspected.  Both ureteral orifices

were identified and found to be grossly patent.  Bladder wall showed some

minimal trabeculation, but no bladder tumors or bladder stones were identified. 

Fluoroscopy showed radiopacity in the area of the right distal ureter consistent

with the patient's stone.  A right retrograde pyelogram was performed which

showed filling defect in the right distal ureter approximately 8 cm above the

right ureteral orifice consistent with the patient's stone.  Following this,

floppy-tipped ZIPwire was then passed up the right ureteral orifice past the

stone up the right ureter to the right kidney.  Following this, right distal

ureter and ureteral orifice were then gently balloon dilated.  Balloon dilator

was removed leaving the ZIPwire in place as a safety wire.  Bladder was then

drained, cystoscope withdrawn from the patient.  Following this, the thin rigid

ureteroscope was then passed per urethra up alongside the ZIPwire up the right

ureteral orifice up the right ureter to the level of the stone.  Stone was found

to be brownish in color and measuring approximately 9 x 5 mm.  The stone was

then treated with holmium laser fiber at a setting of 0.5 and 10 and was

fragmented in approximately 3 mm fragments, which were then removed from the

patient using the 0 tip nitinol basket under direct vision and the stone

fragments were sent for stone analysis.  All the significant stone fragments

were removed from the right ureter, ureter appeared completely intact at the end

of the procedure.  Retrograde pyelogram showed no other filling defects in the

right collecting system and the right side collecting system to be intact. 

Following this, the ureteroscope was withdrawn from the patient, the cystoscope

then replaced and then following this, a 6 x 26 double-J stent was then passed

up the ZIPwire, placing one curl in the right kidney and the other curl in the

bladder and the ZIPwire removed.  Stent position was checked by fluoroscopy and

direct vision, found to be in good position.  The patient noted to have just

some small amount of oozing from mucosal blood vessels at the bladder neck at

the 6 o'clock position, which was lightly fulgurated with the Bugbee electrode

under direct vision.  Hemostasis was checked and found to be excellent.  Bladder

was completely intact at the end of procedure and following this, bladder was

then drained and the cystoscope withdrawn from the patient.  The patient

tolerated the procedure very well, was taken to recovery room in stable

condition.  We will plan on seeing the patient back in Urology office in 1 week

for cystoscopy and stent removal and the patient will also need follow up on his

bulbar urethral stricture in the future.

 



______________________________

ANGELES ZHANG MD



DR:  DEANNA/gordon  JOB#:  [id number] / [id number]

DD:  05/15/2017 15:15  DT:  05/15/2017 19:54

## 2017-05-15 NOTE — PDOC
PROGRESS NOTES


Subjective


Subjective


Pt. with recurrent obstructing right proximal ureteral stone





Objective


Objective





Vital Signs








  Date Time  Temp Pulse Resp B/P (MAP) Pulse Ox O2 Delivery O2 Flow Rate FiO2


 


5/15/17 07:00 97.7 52 20 135/72 (93) 94 Room Air  





 97.7       














Intake and Output 


 


 5/15/17





 06:59


 


Intake Total 100 ml


 


Output Total 1750 ml


 


Balance -1650 ml


 


 


 


Intake Oral 100 ml


 


Output Urine Total 1750 ml


 


# Voids 5


 


# Bowel Movements 1











Physical Exam


Physical Exam


Tender right flank and abdomen





Plan


Plan of Care


Pt's stone was previously treated with stent and ESWL. Stone then moved from 

right kidney to proximal right ureter and obstructed kidney. I discussed the pt'

s stone with him and we discussed the options, alternatives, benefits, risks, 

and possible complications of observation vs. intervention with cystoscopy, 

right retrograde pyelogram, possible right ureteroscopy and laser lithotripsy 

and stent placement. Pt. understands and wishes to proceed with operation. Will 

proceed accordingly.





Comment


Review of Relevant


I have reviewed the following items oseas (where applicable) has been applied.


Labs





Laboratory Tests








Test


  5/14/17


07:15 5/14/17


11:14 5/14/17


13:15 5/14/17


16:24


 


Glucose (Fingerstick)


  147 mg/dL


(70-99) 127 mg/dL


(70-99) 108 mg/dL


(70-99) 198 mg/dL


(70-99)


 


Test


  5/14/17


20:51 5/15/17


07:34 


  


 


 


Glucose (Fingerstick)


  169 mg/dL


(70-99) 145 mg/dL


(70-99) 


  


 








Laboratory Tests








Test


  5/14/17


11:14 5/14/17


13:15 5/14/17


16:24 5/14/17


20:51


 


Glucose (Fingerstick)


  127 mg/dL


(70-99) 108 mg/dL


(70-99) 198 mg/dL


(70-99) 169 mg/dL


(70-99)


 


Test


  5/15/17


07:34 


  


  


 


 


Glucose (Fingerstick)


  145 mg/dL


(70-99) 


  


  


 








Medications





Current Medications


Ondansetron HCl (Zofran) 8 mg PRN Q8HRS  PRN IV NAUSEA/VOMITING Last 

administered on 5/14/17at 08:28;  Start 5/14/17 at 01:00


Morphine Sulfate 2 mg PRN Q2HR  PRN IV severe pain, 2nd choice;  Start 5/14/17 

at 01:00


Morphine Sulfate 4 mg PRN Q2HR  PRN IV SEVERE PAIN Last administered on 5/14/ 17at 08:27;  Start 5/14/17 at 01:00;  Stop 5/14/17 at 13:51;  Status DC


Sodium Chloride 1,000 ml @  125 mls/hr Q8H IV  Last administered on 5/15/17at 00

:36;  Start 5/14/17 at 01:00


Dextrose (Dextrose 50%-Water Syringe) 12.5 gm PRN Q15MIN  PRN IV SEE COMMENTS;  

Start 5/14/17 at 13:30


Sodium Chloride (Normal Saline Flush) 3 ml QSHIFT  PRN IV AFTER MEDS AND BLOOD 

DRAWS;  Start 5/14/17 at 13:30


Tamsulosin HCl (Flomax) 0.4 mg DAILY PO ;  Start 5/14/17 at 14:30


Oxycodone HCl (Roxicodone) 2.5 mg PRN Q4HRS  PRN PO PAIN RATING 5-7;  Start 5/14 /17 at 13:45


Oxycodone HCl (Roxicodone) 5 mg PRN Q4HRS  PRN PO PAIN RATING 8-10;  Start 5/14/ 17 at 14:00


Insulin Aspart (NovoLOG) 0-7 UNITS TIDWMEALS SQ ;  Start 5/14/17 at 17:00


Dextrose (Dextrose 50%-Water Syringe) 12.5 gm PRN Q15MIN  PRN IV SEE COMMENTS;  

Start 5/14/17 at 14:00;  Status UNV


Prochlorperazine Edisylate (Compazine) 10 mg PRN Q8HRS  PRN IV NAUSEA/VOMITING 

Last administered on 5/14/17at 15:18;  Start 5/14/17 at 14:30





Active Scripts


Active


Reported


Flomax (Tamsulosin Hcl) 0.4 Mg Cap.er.24h 1 Cap PO DAILY


Metformin Hcl 500 Mg Tablet 1 Tab PO BID


Vitals/I & O





Vital Sign - Last 24 Hours








 5/14/17 5/14/17 5/14/17 5/14/17





 08:57 11:00 15:00 19:15


 


Temp  98.2 98.3 





  98.2 98.3 


 


Pulse  52 60 


 


Resp 19 18 18 


 


B/P (MAP)  141/88 (105) 150/88 (108) 


 


Pulse Ox 97 97 96 


 


O2 Delivery Room Air Room Air Room Air Room Air


 


    





    





 5/14/17 5/14/17 5/14/17 5/15/17





 19:30 20:26 23:00 03:00


 


Temp 97.7 97.7 98.4 98.5





 97.7 97.7 98.4 98.5


 


Pulse 55 55 56 57


 


B/P (MAP) 154/73 (100) 154/73 (100) 132/67 (88) 126/77 (93)


 


Pulse Ox 96 96 94 96


 


O2 Delivery Room Air Room Air Room Air Room Air


 


    





    





 5/15/17   





 07:00   


 


Temp 97.7   





 97.7   


 


Pulse 52   


 


Resp 20   


 


B/P (MAP) 135/72 (93)   


 


Pulse Ox 94   


 


O2 Delivery Room Air   














Intake and Output   


 


 5/14/17 5/14/17 5/15/17





 14:59 22:59 06:59


 


Intake Total 100 ml  


 


Output Total 400 ml  1350 ml


 


Balance -300 ml  -1350 ml

















ANGELES MCELROY MD May 15, 2017 08:50

## 2017-05-15 NOTE — PDOC
PROGRESS NOTES


Chief Complaint


Chief Complaint


Urolithiasis


renal stones,   flank pain


nausea


abd pain,    obesity, BMI 38





History of Present Illness


History of Present Illness


to OR today


pain a little better


no event





Vitals


Vitals





Vital Signs








  Date Time  Temp Pulse Resp B/P (MAP) Pulse Ox O2 Delivery O2 Flow Rate FiO2


 


5/15/17 07:00 97.7 52 20 135/72 (93) 94 Room Air  





 97.7       











Physical Exam


General:  Alert, Oriented X3, Cooperative, No acute distress


Heart:  Regular rate, No murmurs


Lungs:  Clear, Other


Abdomen:  Normal bowel sounds, Soft (obese)


Extremities:  No clubbing


Skin:  No rashes





Labs


LABS





Laboratory Tests








Test


  5/14/17


11:14 5/14/17


13:15 5/14/17


16:24 5/14/17


20:51


 


Glucose (Fingerstick)


  127 mg/dL


(70-99) 108 mg/dL


(70-99) 198 mg/dL


(70-99) 169 mg/dL


(70-99)


 


Test


  5/15/17


07:34 


  


  


 


 


Glucose (Fingerstick)


  145 mg/dL


(70-99) 


  


  


 











Review of Systems


Review of Systems


no n.v.d





Assessment and Plan


Assessmemt and Plan


uro surg this AM


Problems:  





Comment


Review of Relevant


I have reviewed the following items oseas (where applicable) has been applied.


Labs





Laboratory Tests








Test


  5/14/17


07:15 5/14/17


11:14 5/14/17


13:15 5/14/17


16:24


 


Glucose (Fingerstick)


  147 mg/dL


(70-99) 127 mg/dL


(70-99) 108 mg/dL


(70-99) 198 mg/dL


(70-99)


 


Test


  5/14/17


20:51 5/15/17


07:34 


  


 


 


Glucose (Fingerstick)


  169 mg/dL


(70-99) 145 mg/dL


(70-99) 


  


 








Laboratory Tests








Test


  5/14/17


11:14 5/14/17


13:15 5/14/17


16:24 5/14/17


20:51


 


Glucose (Fingerstick)


  127 mg/dL


(70-99) 108 mg/dL


(70-99) 198 mg/dL


(70-99) 169 mg/dL


(70-99)


 


Test


  5/15/17


07:34 


  


  


 


 


Glucose (Fingerstick)


  145 mg/dL


(70-99) 


  


  


 








Medications





Current Medications


Ondansetron HCl (Zofran) 8 mg PRN Q8HRS  PRN IV NAUSEA/VOMITING Last 

administered on 5/14/17at 08:28;  Start 5/14/17 at 01:00


Morphine Sulfate 2 mg PRN Q2HR  PRN IV severe pain, 2nd choice;  Start 5/14/17 

at 01:00


Morphine Sulfate 4 mg PRN Q2HR  PRN IV SEVERE PAIN Last administered on 5/14/ 17at 08:27;  Start 5/14/17 at 01:00;  Stop 5/14/17 at 13:51;  Status DC


Sodium Chloride 1,000 ml @  125 mls/hr Q8H IV  Last administered on 5/15/17at 09

:36;  Start 5/14/17 at 01:00


Dextrose (Dextrose 50%-Water Syringe) 12.5 gm PRN Q15MIN  PRN IV SEE COMMENTS;  

Start 5/14/17 at 13:30


Sodium Chloride (Normal Saline Flush) 3 ml QSHIFT  PRN IV AFTER MEDS AND BLOOD 

DRAWS;  Start 5/14/17 at 13:30


Tamsulosin HCl (Flomax) 0.4 mg DAILY PO  Last administered on 5/15/17at 09:36;  

Start 5/14/17 at 14:30


Oxycodone HCl (Roxicodone) 2.5 mg PRN Q4HRS  PRN PO PAIN RATING 5-7;  Start 5/14 /17 at 13:45


Oxycodone HCl (Roxicodone) 5 mg PRN Q4HRS  PRN PO PAIN RATING 8-10;  Start 5/14/ 17 at 14:00


Insulin Aspart (NovoLOG) 0-7 UNITS TIDWMEALS SQ ;  Start 5/14/17 at 17:00


Dextrose (Dextrose 50%-Water Syringe) 12.5 gm PRN Q15MIN  PRN IV SEE COMMENTS;  

Start 5/14/17 at 14:00;  Status UNV


Prochlorperazine Edisylate (Compazine) 10 mg PRN Q8HRS  PRN IV NAUSEA/VOMITING 

Last administered on 5/14/17at 15:18;  Start 5/14/17 at 14:30


Ceftriaxone Sodium 1 gm/ Sodium Chloride 50 ml @  100 mls/hr Q24H IV  Last 

administered on 5/15/17at 09:35;  Start 5/15/17 at 09:00





Active Scripts


Active


Reported


Flomax (Tamsulosin Hcl) 0.4 Mg Cap.er.24h 1 Cap PO DAILY


Metformin Hcl 500 Mg Tablet 1 Tab PO BID


Vitals/I & O





Vital Sign - Last 24 Hours








 5/14/17 5/14/17 5/14/17 5/14/17





 11:00 15:00 19:15 19:30


 


Temp 98.2 98.3  97.7





 98.2 98.3  97.7


 


Pulse 52 60  55


 


Resp 18 18  


 


B/P (MAP) 141/88 (105) 150/88 (108)  154/73 (100)


 


Pulse Ox 97 96  96


 


O2 Delivery Room Air Room Air Room Air Room Air


 


    





    





 5/14/17 5/14/17 5/15/17 5/15/17





 20:26 23:00 03:00 07:00


 


Temp 97.7 98.4 98.5 97.7





 97.7 98.4 98.5 97.7


 


Pulse 55 56 57 52


 


Resp    20


 


B/P (MAP) 154/73 (100) 132/67 (88) 126/77 (93) 135/72 (93)


 


Pulse Ox 96 94 96 94


 


O2 Delivery Room Air Room Air Room Air Room Air














Intake and Output   


 


 5/14/17 5/14/17 5/15/17





 14:59 22:59 06:59


 


Intake Total 100 ml  


 


Output Total 400 ml  1350 ml


 


Balance -300 ml  -1350 ml

















CUCA MARTINEZ MD May 15, 2017 09:54

## 2017-05-16 VITALS — SYSTOLIC BLOOD PRESSURE: 134 MMHG | DIASTOLIC BLOOD PRESSURE: 72 MMHG

## 2017-05-16 VITALS
SYSTOLIC BLOOD PRESSURE: 123 MMHG | SYSTOLIC BLOOD PRESSURE: 123 MMHG | DIASTOLIC BLOOD PRESSURE: 67 MMHG | DIASTOLIC BLOOD PRESSURE: 67 MMHG

## 2017-05-16 VITALS — SYSTOLIC BLOOD PRESSURE: 114 MMHG | DIASTOLIC BLOOD PRESSURE: 65 MMHG

## 2017-05-16 RX ADMIN — INSULIN ASPART SCH UNITS: 100 INJECTION, SOLUTION INTRAVENOUS; SUBCUTANEOUS at 08:34

## 2017-05-16 RX ADMIN — BACITRACIN SCH MLS/HR: 5000 INJECTION, POWDER, FOR SOLUTION INTRAMUSCULAR at 07:00

## 2017-05-16 RX ADMIN — TAMSULOSIN HYDROCHLORIDE SCH MG: 0.4 CAPSULE ORAL at 08:27

## 2017-05-16 RX ADMIN — INSULIN ASPART SCH UNITS: 100 INJECTION, SOLUTION INTRAVENOUS; SUBCUTANEOUS at 12:00

## 2017-05-16 RX ADMIN — BACITRACIN SCH MLS/HR: 5000 INJECTION, POWDER, FOR SOLUTION INTRAMUSCULAR at 01:00

## 2017-05-16 NOTE — PDOC
PROGRESS NOTES


Subjective


Subjective


Pt. feeling ok





Objective


Objective





Vital Signs








  Date Time  Temp Pulse Resp B/P (MAP) Pulse Ox O2 Delivery O2 Flow Rate FiO2


 


5/16/17 07:00 97.6 53 18 134/72 (92) 96 Room Air  





 97.6       


 


5/15/17 15:06       8 














Intake and Output 


 


 5/16/17





 07:00


 


Intake Total 1640 ml


 


Output Total 1325 ml


 


Balance 315 ml


 


 


 


Intake Oral 540 ml


 


IV Total 1100 ml


 


Output Urine Total 1325 ml


 


# Voids 3











Physical Exam


Physical Exam


minimal stent discomfort





Plan


Plan of Care


Follow up next week for cysto and stent removal in office





Comment


Review of Relevant


I have reviewed the following items oseas (where applicable) has been applied.


Labs





Laboratory Tests








Test


  5/14/17


11:14 5/14/17


13:15 5/14/17


16:24 5/14/17


20:51


 


Glucose (Fingerstick)


  127 mg/dL


(70-99) 108 mg/dL


(70-99) 198 mg/dL


(70-99) 169 mg/dL


(70-99)


 


Test


  5/15/17


07:34 5/15/17


11:58 5/15/17


17:14 5/15/17


20:31


 


Glucose (Fingerstick)


  145 mg/dL


(70-99) 127 mg/dL


(70-99) 193 mg/dL


(70-99) 278 mg/dL


(70-99)


 


Test


  5/16/17


07:23 


  


  


 


 


Glucose (Fingerstick)


  175 mg/dL


(70-99) 


  


  


 








Laboratory Tests








Test


  5/15/17


11:58 5/15/17


17:14 5/15/17


20:31 5/16/17


07:23


 


Glucose (Fingerstick)


  127 mg/dL


(70-99) 193 mg/dL


(70-99) 278 mg/dL


(70-99) 175 mg/dL


(70-99)








Medications





Current Medications


Ondansetron HCl (Zofran) 8 mg PRN Q8HRS  PRN IV NAUSEA/VOMITING Last 

administered on 5/14/17at 08:28;  Start 5/14/17 at 01:00


Morphine Sulfate 2 mg PRN Q2HR  PRN IV severe pain, 2nd choice Last 

administered on 5/16/17at 01:01;  Start 5/14/17 at 01:00


Morphine Sulfate 4 mg PRN Q2HR  PRN IV SEVERE PAIN Last administered on 5/14/ 17at 08:27;  Start 5/14/17 at 01:00;  Stop 5/14/17 at 13:51;  Status DC


Sodium Chloride 1,000 ml @  125 mls/hr Q8H IV  Last administered on 5/16/17at 07

:00;  Start 5/14/17 at 01:00


Dextrose (Dextrose 50%-Water Syringe) 12.5 gm PRN Q15MIN  PRN IV SEE COMMENTS;  

Start 5/14/17 at 13:30


Sodium Chloride (Normal Saline Flush) 3 ml QSHIFT  PRN IV AFTER MEDS AND BLOOD 

DRAWS;  Start 5/14/17 at 13:30


Tamsulosin HCl (Flomax) 0.4 mg DAILY PO  Last administered on 5/16/17at 08:27;  

Start 5/14/17 at 14:30


Oxycodone HCl (Roxicodone) 2.5 mg PRN Q4HRS  PRN PO PAIN RATING 5-7;  Start 5/14 /17 at 13:45


Oxycodone HCl (Roxicodone) 5 mg PRN Q4HRS  PRN PO PAIN RATING 8-10 Last 

administered on 5/15/17at 22:41;  Start 5/14/17 at 14:00


Insulin Aspart (NovoLOG) 0-7 UNITS TIDWMEALS SQ  Last administered on 5/16/17at 

08:34;  Start 5/14/17 at 17:00


Dextrose (Dextrose 50%-Water Syringe) 12.5 gm PRN Q15MIN  PRN IV SEE COMMENTS;  

Start 5/14/17 at 14:00;  Status UNV


Prochlorperazine Edisylate (Compazine) 10 mg PRN Q8HRS  PRN IV NAUSEA/VOMITING 

Last administered on 5/14/17at 15:18;  Start 5/14/17 at 14:30


Ceftriaxone Sodium 1 gm/ Sodium Chloride 50 ml @  100 mls/hr Q24H IV  Last 

administered on 5/16/17at 08:27;  Start 5/15/17 at 09:00


Ondansetron HCl (Zofran) 4 mg PRN Q6HRS  PRN IV NAUSEA/VOMITING;  Start 5/15/17 

at 12:15;  Stop 5/16/17 at 12:14


Fentanyl Citrate (Fentanyl 2ml Vial) 25 mcg PRN Q5MIN  PRN IV MILD PAIN;  Start 

5/15/17 at 12:15;  Stop 5/16/17 at 12:14


Fentanyl Citrate (Fentanyl 2ml Vial) 50 mcg PRN Q5MIN  PRN IV MODERATE PAIN 

Last administered on 5/15/17at 15:35;  Start 5/15/17 at 12:15;  Stop 5/16/17 at 

12:14


Morphine Sulfate 1 mg PRN Q10MIN  PRN IV SEVERE PAIN;  Start 5/15/17 at 12:15;  

Stop 5/16/17 at 12:14


Ringer's Solution 1,000 ml @  0 mls/hr Q0M IV ;  Start 5/15/17 at 12:04;  Stop 5 /16/17 at 00:03;  Status DC


Lidocaine HCl 2 ml PRN 1X  PRN ID PRIOR TO IV START;  Start 5/15/17 at 12:15;  

Stop 5/16/17 at 12:14


Hydromorphone HCl (Dilaudid) 0.5 mg PRN Q10MIN  PRN IV SEV PAIN, Second choice;

  Start 5/15/17 at 12:15;  Stop 5/16/17 at 12:14


Prochlorperazine Edisylate (Compazine) 5 mg PACU PRN  PRN IV NAUSEA, MRX1;  

Start 5/15/17 at 12:15;  Stop 5/16/17 at 12:14


Dexamethasone Sodium Phosphate (Decadron) 20 mg STK-MED ONCE .ROUTE ;  Start 5/

15/17 at 12:52;  Stop 5/15/17 at 12:53;  Status DC


Ondansetron HCl (Zofran) 4 mg STK-MED ONCE .ROUTE ;  Start 5/15/17 at 12:52;  

Stop 5/15/17 at 12:53;  Status DC


Propofol 20 ml @ As Directed STK-MED ONCE IV ;  Start 5/15/17 at 12:52;  Stop 5/

15/17 at 12:53;  Status DC


Desflurane (Suprane) 30 ml STK-MED ONCE IH ;  Start 5/15/17 at 12:52;  Stop 5/15

/17 at 12:53;  Status DC


Fentanyl Citrate (Fentanyl 2ml Vial) 100 mcg STK-MED ONCE .ROUTE ;  Start 5/15/

17 at 12:52;  Stop 5/15/17 at 12:53;  Status DC


Succinylcholine Chloride (Anectine) 200 mg STK-MED ONCE .ROUTE ;  Start 5/15/17 

at 12:52;  Stop 5/15/17 at 12:53;  Status DC


Lidocaine HCl (Glydo (Lidocaine) Jelly) 6 hortensia STK-MED ONCE .ROUTE ;  Start 5/15/

17 at 13:25;  Stop 5/15/17 at 13:26;  Status DC


Iohexol (Omnipaque 300 Mg/ml) 50 ml STK-MED ONCE .ROUTE  Last administered on 5/

15/17at 14:32;  Start 5/15/17 at 13:25;  Stop 5/15/17 at 13:26;  Status DC


Ephedrine Sulfate 50 mg STK-MED ONCE IV ;  Start 5/15/17 at 14:04;  Stop 5/15/

17 at 14:05;  Status DC


Fentanyl Citrate (Fentanyl 2ml Vial) 100 mcg STK-MED ONCE .ROUTE ;  Start 5/15/

17 at 14:52;  Stop 5/15/17 at 14:53;  Status DC


Propofol 20 ml @ As Directed STK-MED ONCE IV ;  Start 5/15/17 at 14:53;  Stop 5/

15/17 at 14:54;  Status DC





Active Scripts


Active


Reported


Flomax (Tamsulosin Hcl) 0.4 Mg Cap.er.24h 1 Cap PO DAILY


Metformin Hcl 500 Mg Tablet 1 Tab PO BID


Vitals/I & O





Vital Sign - Last 24 Hours








 5/15/17 5/15/17 5/15/17 5/15/17





 11:00 12:53 15:06 15:06


 


Temp 98.1 98.4 98.0 





 98.1 98.4 98.0 


 


Pulse 59 55 98 


 


Resp 20 15 16 


 


B/P (MAP) 172/90 (117) 179/84 143/64 


 


Pulse Ox 90 97 98 


 


O2 Delivery Room Air Room Air Simple Mask Mask


 


O2 Flow Rate   8 8


 


    





    





 5/15/17 5/15/17 5/15/17 5/15/17





 15:21 15:35 15:35 15:50


 


Pulse 96  87 77


 


Resp 20 24 14 20


 


B/P (MAP) 142/52  150/70 132/67


 


Pulse Ox 92 94 93 92


 


O2 Delivery Room Air Room Air Room Air Room Air





 5/15/17 5/15/17 5/15/17 5/15/17





 16:22 16:29 18:26 19:26


 


Temp 97.1 97.5 97.5 98.0





 97.1 97.5 97.5 98.0


 


Pulse 67 81 89 89


 


Resp 20 20 20 18


 


B/P (MAP) 163/77 (105) 146/76 (99) 122/98 (106) 133/56 (81)


 


Pulse Ox 96 95 94 94


 


O2 Delivery Room Air Room Air Room Air Room Air


 


    





    





 5/15/17 5/15/17 5/15/17 5/15/17





 20:00 22:34 22:41 23:40


 


Temp  98.3  





  98.3  


 


Pulse  88  


 


Resp  18 16 17


 


B/P (MAP)  124/70 (88)  


 


Pulse Ox  95  


 


O2 Delivery Mask Room Air  Room Air


 


    





    





 5/16/17 5/16/17 5/16/17 5/16/17





 01:01 01:30 03:39 07:00


 


Temp   98.5 97.6





   98.5 97.6


 


Pulse   60 53


 


Resp 18 19 18 18


 


B/P (MAP)   114/65 (81) 134/72 (92)


 


Pulse Ox   95 96


 


O2 Delivery Room Air Room Air Room Air Room Air














Intake and Output   


 


 5/15/17 5/15/17 5/16/17





 15:00 23:00 07:00


 


Intake Total  1100 ml 540 ml


 


Output Total   1325 ml


 


Balance  1100 ml -785 ml

















ANGELES MCELROY MD May 16, 2017 10:23

## 2017-05-16 NOTE — CONS
DATE OF CONSULTATION:  05/15/2017



LOCATION:  The patient is in room 404.



HISTORY OF PRESENT ILLNESS:  The patient is a very pleasant 62-year-old white

male with history of a right-sided stone.  The patient originally had presented

last month with a 9 x 5 mm right proximal ureteral stone with obstruction.  He

had undergone cystoscopy, retrograde pyelogram and right ureteral stenting

followed by right-sided ESWL.  The stone had pushed back to the right kidney and

was treated; however, it did not appear to have had any significant effect on

the stone.  However, due to its peripheral location in the kidney, the stent was

removed and patient had been doing well until 05/13/2017, when he began having

right-sided flank and abdominal pain again.  The patient had repeat scan, it

appeared that the stone had moved from the right kidney down into the right

proximal ureter again obstructing _____.  The patient is now in the hospital for

further treatment.



PAST MEDICAL HISTORY:  Significant for diabetes, hypertension.



PAST SURGICAL HISTORY:  The patient has had prior shoulder surgery and back

surgery.



MEDICATIONS:  The patient is on medication for his blood sugars.



REVIEW OF SYSTEMS:  The patient with some mild right-sided flank and abdominal

pain.



Currently, he is afebrile, vital signs are stable.  Glucose fingerstick is 145.



PHYSICAL EXAMINATION:

GENERAL:  Well-developed, well-nourished white male in just minimal discomfort.

ABDOMEN:  The patient with some mild tenderness in the right flank and right

abdomen.  No left-sided flank or abdominal tenderness.



PLAN:  I discussed with the patient his current stone, which does appear to be

in the 9 x 5 mm range, in the right proximal ureter with obstruction with some

right perirenal stranding and inflammation.  I discussed with the patient the

options, alternatives, benefits, risks and possible complications of observation

versus intervention surgically with cystoscopy, right retrograde pyelogram,

possible ureteroscopy, possible laser lithotripsy and possible right ureteral

stent placement.  He understands this and does wish to proceed with operation. 

We will therefore proceed accordingly today.



I certainly appreciate being allowed to participate in this patient's care.

 



______________________________

ANGELES MCELROY MD



DR:  DEANNA/gordon  JOB#:  [id number] / [id number]

DD:  05/15/2017 08:56  DT:  05/16/2017 00:28

## 2017-05-17 NOTE — DS
DATE OF DISCHARGE:  05/16/2017



CHIEF COMPLAINT:  Urolithiasis.



HOSPITAL COURSE:  The patient is a 62-year-old gentleman who was transferred

from [facility] with a stone in his right ureter by CT with accompanying

symptoms.  He was admitted here, seen by Dr. Zhang and stent was placed in his

ureter.  Pain resolved and the patient was discharged to home with close

followup with Dr. Zhang.



PHYSICAL EXAMINATION:

VITAL SIGNS:  Show a blood pressure of 123/67, heart rate of 54, respiratory

rate at 18.  He is afebrile.

GENERAL:  This is a morbidly obese 62-year-old  gentleman, alert and

oriented, in no acute distress.

LUNGS:  Clear.

HEART:  Regular rate and rhythm.

ABDOMEN:  Positive bowel sounds, soft, nontender; flank without any pain to

percussion.

EXTREMITIES:  Show no edema.



DISCHARGE DIAGNOSIS:  Urolithiasis on the right status post ureteral stent

placement.



DISCHARGE DISPOSITION:  To home.



DISCHARGE CONDITION:  Improved.



DISCHARGE MEDICATIONS:  Please refer to MAR.



DISCHARGE INSTRUCTIONS:  The patient will follow up with his PCP, Dr. Rolando Dean as well as Dr. Zhang as previously arranged.

 



______________________________

JOSLYN SIEGEL MD



DR:  UR/nts  JOB#:  [id number] / [id number]

DD:  05/17/2017 10:00  DT:  05/17/2017 21:19



ROLANDO Bauer MD

MTDD

## 2017-05-24 LAB
BLD.DRIED STONE QL: (no result)
CA BILIRUB CRY STONE QL IR: (no result)
CALCIUM OXALATE DIHYDRATE MFR STONE IR: (no result) %
CELL MATERIAL STONE EST-MCNT: (no result) %
CHOLEST SERPL-MCNC: (no result) MG/DL
COM CRY STONE QL IR: 97 %
CYSTINE UR-SCNC: (no result) UMOL/L
NA URATE CRY STONE QL IR: (no result)
URATE DIHYD CRY STONE QL IR: (no result)
URATE SERPL-MCNC: (no result) MG/DL
WT STONE: 59 MG

## 2017-07-07 ENCOUNTER — HOSPITAL ENCOUNTER (OUTPATIENT)
Dept: HOSPITAL 63 - US | Age: 62
Discharge: HOME | End: 2017-07-07
Payer: COMMERCIAL

## 2017-07-07 DIAGNOSIS — Z87.442: ICD-10-CM

## 2017-07-07 DIAGNOSIS — N20.0: Primary | ICD-10-CM

## 2017-07-07 PROCEDURE — 76770 US EXAM ABDO BACK WALL COMP: CPT

## 2017-07-07 NOTE — RAD
Indication kidney stones. Occasional discomfort on the right.



Grayscale imaging was performed. Note is made of a renal ultrasound

examinations June 2009. Note is made of a CT examination of the abdomen and

pelvis 5/13/2017 demonstrating a calculus at the right UPJ.



The right kidney measures 10.8 x 6 x 5 cm. No hydronephrosis or mass is seen.

No definite renal calculi are seen. The left kidney measures 11.5 x 5.5 x 5.8

cm. There is a left renal calculus measuring approximately 8 mm in greatest

dimension. There is mild prominence of the left collecting system. No marked

hydronephrosis is seen. The urinary bladder appears grossly normal.



IMPRESSION: Minimal prominence of the left collecting system.

                         Left renal calculus.

                         No right-sided hydronephrosis

## 2019-01-10 ENCOUNTER — HOSPITAL ENCOUNTER (OUTPATIENT)
Dept: HOSPITAL 63 - RAD | Age: 64
Discharge: HOME | End: 2019-01-10
Payer: COMMERCIAL

## 2019-01-10 DIAGNOSIS — M17.11: Primary | ICD-10-CM

## 2019-01-10 DIAGNOSIS — M76.891: ICD-10-CM

## 2019-01-10 PROCEDURE — 73560 X-RAY EXAM OF KNEE 1 OR 2: CPT

## 2019-01-11 NOTE — RAD
Right knee, 2 views, 1/10/2019:

 

HISTORY: Knee pain

 

There is mild narrowing of the medial compartment of the knee joint with 

mild marginal spurring. There is mild spurring at the patellofemoral 

articulation. No fracture or dislocation is identified. Fullness of the 

soft tissues in the suprapatellar bursa region raises the possibility of a

small joint effusion.

 

IMPRESSION:

1. Mild degenerative change.

2. No acute bony abnormality is detected.

 

Electronically signed by: Rick Moritz, MD (1/11/2019 7:51 AM) [facility]

## 2019-02-11 ENCOUNTER — HOSPITAL ENCOUNTER (OUTPATIENT)
Dept: HOSPITAL 63 - RAD | Age: 64
Discharge: HOME | End: 2019-02-11
Payer: COMMERCIAL

## 2019-02-11 DIAGNOSIS — M17.11: Primary | ICD-10-CM

## 2019-02-11 PROCEDURE — 73565 X-RAY EXAM OF KNEES: CPT

## 2019-02-11 PROCEDURE — 73560 X-RAY EXAM OF KNEE 1 OR 2: CPT

## 2019-02-12 NOTE — RAD
EXAM: Bilateral knees, standing view; right knee, 2 views.

 

HISTORY: Pain.

 

COMPARISON: None.

 

FINDINGS: A frontal standing view both knees and lateral and sunrise views

of the right knee are obtained. There is medial compartment joint space 

narrowing, subchondral sclerosis and spurring involving the right knee. 

There is also minimal right patellar spurring. There is suspected slight 

left genu valgus. There is no right knee effusion.

 

IMPRESSION: 

1. Mild medial and minimal patellofemoral compartment osteoarthritis of 

the right knee.

2. Suspected slight left genu valgus.

 

Electronically signed by: Lisy Oliva MD (2/12/2019 8:25 AM) [facility]

## 2019-02-12 NOTE — RAD
EXAM: Bilateral knees, standing view; right knee, 2 views.

 

HISTORY: Pain.

 

COMPARISON: None.

 

FINDINGS: A frontal standing view both knees and lateral and sunrise views

of the right knee are obtained. There is medial compartment joint space 

narrowing, subchondral sclerosis and spurring involving the right knee. 

There is also minimal right patellar spurring. There is suspected slight 

left genu valgus. There is no right knee effusion.

 

IMPRESSION: 

1. Mild medial and minimal patellofemoral compartment osteoarthritis of 

the right knee.

2. Suspected slight left genu valgus.

 

Electronically signed by: Lisy Oliva MD (2/12/2019 8:25 AM) [facility]

## 2019-02-25 ENCOUNTER — HOSPITAL ENCOUNTER (OUTPATIENT)
Dept: HOSPITAL 61 - SURGPAT | Age: 64
Discharge: HOME | End: 2019-02-25
Attending: ORTHOPAEDIC SURGERY
Payer: MEDICARE

## 2019-02-25 DIAGNOSIS — M17.11: Primary | ICD-10-CM

## 2019-02-25 DIAGNOSIS — I10: ICD-10-CM

## 2019-02-25 LAB
ALBUMIN SERPL-MCNC: 3.7 G/DL (ref 3.4–5)
ANION GAP SERPL CALC-SCNC: 10 MMOL/L (ref 6–14)
APTT BLD: 26 SEC (ref 24–38)
APTT PPP: YELLOW S
BACTERIA #/AREA URNS HPF: 0 /HPF
BASOPHILS # BLD AUTO: 0.1 X10^3/UL (ref 0–0.2)
BASOPHILS NFR BLD: 1 % (ref 0–3)
BILIRUB UR QL STRIP: NEGATIVE
BUN SERPL-MCNC: 13 MG/DL (ref 8–26)
CALCIUM SERPL-MCNC: 8.8 MG/DL (ref 8.5–10.1)
CHLORIDE SERPL-SCNC: 106 MMOL/L (ref 98–107)
CO2 SERPL-SCNC: 26 MMOL/L (ref 21–32)
CREAT SERPL-MCNC: 0.9 MG/DL (ref 0.7–1.3)
EOSINOPHIL NFR BLD: 0.3 X10^3/UL (ref 0–0.7)
EOSINOPHIL NFR BLD: 6 % (ref 0–3)
ERYTHROCYTE [DISTWIDTH] IN BLOOD BY AUTOMATED COUNT: 13.8 % (ref 11.5–14.5)
FIBRINOGEN PPP-MCNC: CLEAR MG/DL
GFR SERPLBLD BASED ON 1.73 SQ M-ARVRAT: 84.4 ML/MIN
GLUCOSE SERPL-MCNC: 125 MG/DL (ref 70–99)
HBA1C MFR BLD: 6.2 % (ref 4.8–5.6)
HCT VFR BLD CALC: 45.6 % (ref 39–53)
HGB BLD-MCNC: 15.3 G/DL (ref 13–17.5)
LYMPHOCYTES # BLD: 2.3 X10^3/UL (ref 1–4.8)
LYMPHOCYTES NFR BLD AUTO: 39 % (ref 24–48)
MCH RBC QN AUTO: 30 PG (ref 25–35)
MCHC RBC AUTO-ENTMCNC: 34 G/DL (ref 31–37)
MCV RBC AUTO: 90 FL (ref 79–100)
MONO #: 0.5 X10^3/UL (ref 0–1.1)
MONOCYTES NFR BLD: 9 % (ref 0–9)
NEUT #: 2.8 X10^3UL (ref 1.8–7.7)
NEUTROPHILS NFR BLD AUTO: 46 % (ref 31–73)
NITRITE UR QL STRIP: NEGATIVE
PH UR STRIP: 5.5 [PH]
PLATELET # BLD AUTO: 225 X10^3/UL (ref 140–400)
POTASSIUM SERPL-SCNC: 4.2 MMOL/L (ref 3.5–5.1)
PROT UR STRIP-MCNC: NEGATIVE MG/DL
PROTHROMBIN TIME: 13.9 SEC (ref 11.7–14)
RBC # BLD AUTO: 5.07 X10^6/UL (ref 4.3–5.7)
RBC #/AREA URNS HPF: (no result) /HPF (ref 0–2)
SODIUM SERPL-SCNC: 142 MMOL/L (ref 136–145)
SQUAMOUS #/AREA URNS LPF: (no result) /LPF
UROBILINOGEN UR-MCNC: 0.2 MG/DL
WBC # BLD AUTO: 6.1 X10^3/UL (ref 4–11)
WBC #/AREA URNS HPF: (no result) /HPF (ref 0–4)

## 2019-02-25 PROCEDURE — 82040 ASSAY OF SERUM ALBUMIN: CPT

## 2019-02-25 PROCEDURE — 81001 URINALYSIS AUTO W/SCOPE: CPT

## 2019-02-25 PROCEDURE — 83036 HEMOGLOBIN GLYCOSYLATED A1C: CPT

## 2019-02-25 PROCEDURE — 80048 BASIC METABOLIC PNL TOTAL CA: CPT

## 2019-02-25 PROCEDURE — 36415 COLL VENOUS BLD VENIPUNCTURE: CPT

## 2019-02-25 PROCEDURE — 85651 RBC SED RATE NONAUTOMATED: CPT

## 2019-02-25 PROCEDURE — 85025 COMPLETE CBC W/AUTO DIFF WBC: CPT

## 2019-02-25 PROCEDURE — 71046 X-RAY EXAM CHEST 2 VIEWS: CPT

## 2019-02-25 PROCEDURE — 87641 MR-STAPH DNA AMP PROBE: CPT

## 2019-02-25 PROCEDURE — 85610 PROTHROMBIN TIME: CPT

## 2019-02-25 PROCEDURE — 85730 THROMBOPLASTIN TIME PARTIAL: CPT

## 2019-02-25 PROCEDURE — 93005 ELECTROCARDIOGRAM TRACING: CPT

## 2019-02-25 PROCEDURE — 82306 VITAMIN D 25 HYDROXY: CPT

## 2019-02-25 NOTE — RAD
EXAM: Chest, 2 views.

 

HISTORY: Preoperative evaluation. Hypertension.

 

COMPARISON: None.

 

FINDINGS: 2 views of the chest are obtained. There is no infiltrate, 

pleural effusion or pneumothorax. The heart is normal in size. There is a 

calcified granuloma within the right mid thorax.

 

IMPRESSION: No acute pulmonary finding.

 

Electronically signed by: Lisy Oliva MD (2/25/2019 1:49 PM) [place] [id number]

## 2019-02-25 NOTE — EKG
[facility]

              [address] [place] [address]

Test Date:    2019               Test Time:    12:41:47

Pat Name:     NOAH BUTT             Department:   

Patient ID:   [id number]           Room:          

Gender:       M                        Technician:   NANCIE

:          1955               Requested By: ERLINDA STRICKLAND

Order Number: [id number]           Reading MD:   Jayme Nunn MD

                                 Measurements

Intervals                              Axis          

Rate:         71                       P:            42

MI:           116                      QRS:          4

QRSD:         90                       T:            39

QT:           390                                    

QTc:          424                                    

                           Interpretive Statements

SINUS RHYTHM

LAE

Electronically Signed On 2019 9:28:37 CST by Jayme Nunn MD

## 2019-03-18 NOTE — PDOC1
History and Physical


Date of Admission


Date of Admission


3/19/19





Identification/Chief Complaint


Chief Complaint


Right knee osteoarthritis pain





Source


Source:  Chart review





History of Present Illness


History of Present Illness


63-year-old man with bilateral knee pain, right greater than left. Previously 

he was followed by Dr. Chavez who felt he was getting close to needing knee 

replacement surgery. I believe he is on disability now but previously worked as 

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 He has 

arthritis in multiple joints, for which he is on meloxicam. The Meloxicam helps 

his thumb CMC joints bilaterally but doesn't really help the knees. He's been 

living with a friend although the friend is moving. His knee occasionally 

causes him to fall down. The knee locks. Sometimes it feels like it stops him 

in his tracks. He can't sleep at night due to the knee throbbing. For enjoyment 

he likes riding motorcycles. He had back surgery about 20 years ago. He has a 

history of a rotator cuff repair of the left shoulder. He has a blood pressure 

medicine, and type 2 diabetes for which he is on metformin. His primary care 

provider is [facility] under Dr. Myron Montiel.





Past Medical History


Renal/:  Other (prostate issues)





Past Surgical History


Past Surgical History


Lumbar surgery, rotator cuff repair, right knee surgery, tendon repair left leg

, tumor removal neck, cyst removal right hand, shoulder surgery 





Family History


Family History


Prostate cancer


Family History:  Cancer





Social History


Smoke:  No


ALCOHOL:  occassional





Current Medications


Current Medications





Current Medications


Morphine Sulfate 5 mg/Ketorolac Tromethamine 30 mg/Ropivacaine 60 ml/

Epinephrine HCl 0.5 mg/Sodium Chloride 100 ml @  100 mls/hr 1X  ONCE INT ART ;  

Start 3/19/19 at 06:00;  Stop 3/19/19 at 06:59


Meloxicam (Mobic) 15 mg 1X PREOP  PRN PO PRIOR TO PROCEDURE;  Start 3/19/19 at 

06:00;  Stop 3/19/19 at 18:00


Acetaminophen/ Hydrocodone Bitart (Lortab 7.5/325) 2 tab 1X PREOP  PRN PO PRIOR 

TO PROCEDURE;  Start 3/19/19 at 06:00;  Stop 3/19/19 at 18:00


Tranexamic Acid 1000 mg/Sodium Chloride 60 ml @ 60 mls/hr 1X PERIOP  ONCE INJ ;

  Start 3/19/19 at 06:00;  Stop 3/19/19 at 06:59


Tranexamic Acid 1000 mg/Sodium Chloride 60 ml @ 60 mls/hr 1X PERIOP  ONCE INJ ;

  Start 3/19/19 at 08:00;  Stop 3/19/19 at 08:59


Cefazolin Sodium 3 gm/Dextrose 100 ml @  200 mls/hr 1X PREOP  PRN IV PRIOR TO 

PROCEDURE;  Start 3/19/19 at 06:00;  Stop 3/19/19 at 15:00


Ondansetron HCl (Zofran) 4 mg PRN Q6HRS  PRN IV NAUSEA/VOMITING;  Start 3/19/19 

at 07:00;  Stop 3/19/19 at 20:00


Fentanyl Citrate (Fentanyl 2ml Vial) 25 mcg PRN Q5MIN  PRN IV MILD PAIN;  Start 

3/19/19 at 07:00;  Stop 3/19/19 at 20:00


Fentanyl Citrate (Fentanyl 2ml Vial) 50 mcg PRN Q5MIN  PRN IV MODERATE TO 

SEVERE PAIN;  Start 3/19/19 at 07:00;  Stop 3/19/19 at 20:00


Morphine Sulfate (Morphine Sulfate) 1 mg PRN Q10MIN  PRN IV SEVERE PAIN;  Start 

3/19/19 at 07:00;  Stop 3/19/19 at 20:00


Ringer's Solution 1,000 ml @  30 mls/hr Q24H IV ;  Start 3/19/19 at 07:00;  

Stop 3/19/19 at 18:59


Lidocaine HCl (Xylocaine-Mpf 1% 2ml Vial) 2 ml PRN 1X  PRN ID PRIOR TO IV START

;  Start 3/19/19 at 07:00;  Stop 3/19/19 at 20:00


Hydromorphone HCl (Dilaudid) 0.5 mg PRN Q10MIN  PRN IV SEV PAIN, Second choice;

  Start 3/19/19 at 07:00;  Stop 3/19/19 at 20:00


Prochlorperazine Edisylate (Compazine) 5 mg PACU PRN  PRN IV NAUSEA, MRX1;  

Start 3/19/19 at 07:00;  Stop 3/19/19 at 20:00





Active Scripts


Active


Reported


Ketoconazole 15 Gm Cream..g. 15 Gm TP BID


Triamcinolone Acetonide 0.025% Cream (Triamcinolone Acetonide) 15 Gm Cream..g. 

1 Ghada TP PRN DAILY PRN


Meloxicam 15 Mg Tablet 15 Mg PO DAILY


Vasotec (Enalapril Maleate) 20 Mg Tablet 10 Mg PO DAILY


Finasteride 5 Mg Tablet 5 Mg PO HS


Flomax (Tamsulosin Hcl) 0.4 Mg Cap.er.24h 1 Cap PO HS


Metformin Hcl 500 Mg Tablet 1,000 Mg PO BID





Allergies


Allergies:  


Coded Allergies:  


     No Known Drug Allergies (Unverified , 5/15/17)





ROS


General:  No: Chills, Night Sweats


Eyes:  No Double vision


HEENT:  No: Heacaches


Hematological and Lymphatic:  No: Bleeding Problems, Blood Clots


Respiratory:  No: Cough, Pleuritic Pain, Shortness of breath


Cardiovascular:  No Chest Pain, No Palpitations


Gastrointestinal:  No Nausea, No Vomiting, No Diarrhea


Genitourinary:  No Dysuria, No Hematuria


Musculoskeletal:  Yes Joint Pain


Neurological:  No Behavorial Changes, No Bowel/Bladder ControlChng, No Confusion

, No Dizziness





Physical Exam


General:  Alert, Cooperative


HEENT:  Atraumatic


Lungs:  Normal air movement


Heart:  RRR


Abdomen:  Soft


Extremities:  Other (RIGHT knee shows a mildly antalgic gait. There is varus 

alignment. No masses. No detectable effusion. Tenderness on the joint lines. 

Range of motion is 3-115 degrees. There is crepitus with range of motion, and 

pain at the extremes of motion. The knee is stable to varus and valgus stress 

without subluxation or laxity. Muscle strength is 5/5 for the quadriceps. The 

hamstring strength is 5/5. The skin is normal with no scars, rashes, lesions or 

ulcers. Light touch sensation is intact. No edema and no varicosities. Dorsalis 

pedis pulse is intact and capillary refill is normal)


Skin:  No rashes, No breakdown, No significant lesion


Neuro:  Normal speech, Normal tone, Sensation intact





Images


Images


Report reviewed, images independently reviewed. The right knee has varus 

alignment and medial joint space narrowing with osteophytes. The 19 

weightbearing film shows medial bone-on-bone contact of the right knee. The 

radiologist mentions valgus malalignment "genu valgum" of the left knee, but I 

only see varus alignment of the right knee and normal alignment of the left 

knee.


[facility] [address], [place] [address] 


[phone] 


IMAGING REPORT 


Signed 


PATIENT: NOAH BUTT ACCOUNT: [id number] MRN#: [id number] 


: 1955 LOCATION: [facility] AGE: 63 


SEX: M EXAM DT: 19 ACCESSION#: [id number] 


STATUS: REG CLI ORD. PHYSICIAN: ERLINDA STRICKLAND MD 


REASON: Pain in right knee 


PROCEDURE: KNEE RIGHT 2V 


EXAM: Bilateral knees, standing view; right knee, 2 views. 


HISTORY: Pain. 


COMPARISON: None. 


FINDINGS: A frontal standing view both knees and lateral and sunrise views 


of the right knee are obtained. There is medial compartment joint space 


narrowing, subchondral sclerosis and spurring involving the right knee. 


There is also minimal right patellar spurring. There is suspected slight 


left genu valgus. There is no right knee effusion. 


IMPRESSION: 


1. Mild medial and minimal patellofemoral compartment osteoarthritis of 


the right knee. 


2. Suspected slight left genu valgus. 


Electronically signed by: Shay Mariee MD (2019 8:25 AM) [facility] 


DICTATED AND SIGNED BY: SHAY MARIEE MD 


DATE: 19 


CC: ERLINDA STRICKLAND MD; CITLALY MARCIAL MD.





VTE Prophylaxis Ordered


VTE Prophylaxis Devices:  Yes


VTE Pharmacological Prophylaxi:  Yes





Assessment/Plan


Assessment/Plan


He has significant right knee osteoarthritis. He tried nonoperative treatment 

such as meloxicam. The knee gives out, and interferes with sleeping, and causes 

difficulty with activity. He would otherwise be more active. We discussed the 

potential for additional nonoperative treatment such as bracing or injections. 

He does have a varus knee and bone-on-bone contact medially, and I discussed 

with him that arthroscopic surgery is not going to be helpful in this 

situation. He is at an appropriate age to consider knee replacement surgery. I 

recommended a right total knee arthroplasty, but offered him other nonoperative 

treatment if he desires. He would like to proceed with right total knee 

replacement. We discussed the potential risks of infection, neurovascular injury

, bleeding, blood clots, need for revision surgery, or other potential surgical 

or anesthetic complications. He is here for elective right total knee 

arthroplasty.











ERLINDA STRICKLAND MD Mar 18, 2019 19:53

## 2019-03-19 ENCOUNTER — HOSPITAL ENCOUNTER (INPATIENT)
Dept: HOSPITAL 61 - OPSVCIP | Age: 64
LOS: 3 days | Discharge: NURSING, REHAB OR LONG-TERM CARE | DRG: 470 | End: 2019-03-22
Attending: ORTHOPAEDIC SURGERY | Admitting: ORTHOPAEDIC SURGERY
Payer: COMMERCIAL

## 2019-03-19 VITALS — HEIGHT: 69 IN | BODY MASS INDEX: 38.36 KG/M2 | WEIGHT: 259 LBS

## 2019-03-19 VITALS — DIASTOLIC BLOOD PRESSURE: 72 MMHG | SYSTOLIC BLOOD PRESSURE: 133 MMHG

## 2019-03-19 VITALS — SYSTOLIC BLOOD PRESSURE: 134 MMHG | DIASTOLIC BLOOD PRESSURE: 68 MMHG

## 2019-03-19 VITALS — SYSTOLIC BLOOD PRESSURE: 128 MMHG | DIASTOLIC BLOOD PRESSURE: 76 MMHG

## 2019-03-19 VITALS — DIASTOLIC BLOOD PRESSURE: 74 MMHG | SYSTOLIC BLOOD PRESSURE: 128 MMHG

## 2019-03-19 VITALS — DIASTOLIC BLOOD PRESSURE: 72 MMHG | SYSTOLIC BLOOD PRESSURE: 130 MMHG

## 2019-03-19 VITALS — SYSTOLIC BLOOD PRESSURE: 120 MMHG | DIASTOLIC BLOOD PRESSURE: 59 MMHG

## 2019-03-19 VITALS — DIASTOLIC BLOOD PRESSURE: 76 MMHG | SYSTOLIC BLOOD PRESSURE: 131 MMHG

## 2019-03-19 DIAGNOSIS — Z80.42: ICD-10-CM

## 2019-03-19 DIAGNOSIS — M17.11: Primary | ICD-10-CM

## 2019-03-19 DIAGNOSIS — Z86.718: ICD-10-CM

## 2019-03-19 DIAGNOSIS — E11.9: ICD-10-CM

## 2019-03-19 PROCEDURE — 36415 COLL VENOUS BLD VENIPUNCTURE: CPT

## 2019-03-19 PROCEDURE — 85014 HEMATOCRIT: CPT

## 2019-03-19 PROCEDURE — A4461 SURGICL DRESS HOLD NON-REUSE: HCPCS

## 2019-03-19 PROCEDURE — 85025 COMPLETE CBC W/AUTO DIFF WBC: CPT

## 2019-03-19 PROCEDURE — 0SRC069 REPLACEMENT OF RIGHT KNEE JOINT WITH OXIDIZED ZIRCONIUM ON POLYETHYLENE SYNTHETIC SUBSTITUTE, CEMENTED, OPEN APPROACH: ICD-10-PCS | Performed by: ORTHOPAEDIC SURGERY

## 2019-03-19 PROCEDURE — 86901 BLOOD TYPING SEROLOGIC RH(D): CPT

## 2019-03-19 PROCEDURE — C1769 GUIDE WIRE: HCPCS

## 2019-03-19 PROCEDURE — 86850 RBC ANTIBODY SCREEN: CPT

## 2019-03-19 PROCEDURE — 73560 X-RAY EXAM OF KNEE 1 OR 2: CPT

## 2019-03-19 PROCEDURE — 85018 HEMOGLOBIN: CPT

## 2019-03-19 PROCEDURE — 86900 BLOOD TYPING SEROLOGIC ABO: CPT

## 2019-03-19 PROCEDURE — A7015 AEROSOL MASK USED W NEBULIZE: HCPCS

## 2019-03-19 PROCEDURE — 88305 TISSUE EXAM BY PATHOLOGIST: CPT

## 2019-03-19 PROCEDURE — 82962 GLUCOSE BLOOD TEST: CPT

## 2019-03-19 PROCEDURE — 85027 COMPLETE CBC AUTOMATED: CPT

## 2019-03-19 PROCEDURE — 88311 DECALCIFY TISSUE: CPT

## 2019-03-19 RX ADMIN — TAMSULOSIN HYDROCHLORIDE SCH MG: 0.4 CAPSULE ORAL at 20:43

## 2019-03-19 RX ADMIN — ASPIRIN SCH MG: 325 TABLET, DELAYED RELEASE ORAL at 20:43

## 2019-03-19 RX ADMIN — FENTANYL CITRATE PRN MCG: 50 INJECTION INTRAMUSCULAR; INTRAVENOUS at 12:17

## 2019-03-19 RX ADMIN — OXYCODONE HYDROCHLORIDE AND ACETAMINOPHEN PRN TAB: 5; 325 TABLET ORAL at 20:44

## 2019-03-19 RX ADMIN — INSULIN LISPRO SCH UNITS: 100 INJECTION, SOLUTION INTRAVENOUS; SUBCUTANEOUS at 17:04

## 2019-03-19 RX ADMIN — FENTANYL CITRATE PRN MCG: 50 INJECTION INTRAMUSCULAR; INTRAVENOUS at 12:10

## 2019-03-19 RX ADMIN — KETOROLAC TROMETHAMINE SCH MLS/HR: 30 INJECTION, SOLUTION INTRAMUSCULAR at 16:56

## 2019-03-19 RX ADMIN — ONDANSETRON SCH MG: 4 TABLET, ORALLY DISINTEGRATING ORAL at 12:00

## 2019-03-19 RX ADMIN — LISINOPRIL SCH MG: 20 TABLET ORAL at 17:01

## 2019-03-19 RX ADMIN — ONDANSETRON SCH MG: 2 INJECTION INTRAMUSCULAR; INTRAVENOUS at 18:00

## 2019-03-19 RX ADMIN — ONDANSETRON SCH MG: 4 TABLET, ORALLY DISINTEGRATING ORAL at 16:57

## 2019-03-19 RX ADMIN — BACITRACIN SCH MLS/HR: 5000 INJECTION, POWDER, FOR SOLUTION INTRAMUSCULAR at 17:01

## 2019-03-19 RX ADMIN — OXYCODONE HYDROCHLORIDE AND ACETAMINOPHEN PRN TAB: 5; 325 TABLET ORAL at 15:19

## 2019-03-19 RX ADMIN — ONDANSETRON SCH MG: 2 INJECTION INTRAMUSCULAR; INTRAVENOUS at 12:00

## 2019-03-19 RX ADMIN — SENNOSIDES AND DOCUSATE SODIUM SCH TAB: 8.6; 5 TABLET ORAL at 15:19

## 2019-03-19 RX ADMIN — FINASTERIDE SCH MG: 5 TABLET, FILM COATED ORAL at 20:43

## 2019-03-19 RX ADMIN — MULTIPLE VITAMINS W/ MINERALS TAB SCH TAB: TAB at 15:19

## 2019-03-19 NOTE — NUR
voided 150 cc. bladder area is tender to touch. bladder scan completed 750cc. straight cath 
with a return of 650 yellow urine. states feels much better.

## 2019-03-19 NOTE — NUR
Valentin  was received from recovery. daughters at bedside. denies allergies. he has a walker. he 
has good sensation, motions and pulses bilateral lower extremities. dressing to right knee 
is clean dry and intact. Hemovac unclamped with an immediate return serosanguineous 
drainage, iac and domingo dressing intact. he is rating his pain "4".

## 2019-03-19 NOTE — RAD
EXAM: AP and crosstable lateral views right knee

 

DATE: 3/19/2019 11:30 AM

 

INDICATION: POST OP KNEE IN PACU.

 

COMPARISON: No Prior

 

FINDINGS:

2 views right knee demonstrate a right total knee arthroplasty, in good 

alignment without definite hardware complication. Components are well 

seated without significant periprosthetic lucency. Expected soft tissue 

changes including intra-articular and soft tissue gas and drain are seen. 

No evidence of acute fracture or dislocation.

 

IMPRESSION:

Right total knee arthroplasty, in good alignment without definite hardware

complication or fracture.

 

Electronically signed by: Mitch Alberto MD (3/19/2019 12:37 PM) [facility]

## 2019-03-19 NOTE — PDOC4
Operative Note


Operative Note


Date of Procedure:     March 19, 2019


Pre-Op Diagnosis:    Unilateral primary osteoarthritis, right knee.  M17.11 


Post-Op Diagnosis:    same


Procedure:       right total knee arthroplasty  with patella resurfacing, CPT 

98592


Surgeon:       Erlinda Cadet MD 


Assistant:       AIDEN Paris (Annie)


Anesthesia:       General


EBL:       100 mL


Specimens Obtained:    right knee bone and soft tissue


Complications:       none


Implant Company:    [employer]


Drains:       hemovac plus pain catheter


Tourniquet time:      54 Minutes 


Tourniquet Pressure:    350 mm Hg


Indications for Procedure:    Arthritis pain unrelieved by nonoperative 

management.


Findings:       Severe osteoarthritis with bone on bone contact medially. Areas 

of full thickness cartilage loss on the lateral femoral condyle. 


Implants used:    Size 5 right bicruciate stabilized Journey II BCS Oxinium 

femoral component, size 5 right Journey nonporous tibial baseplate, size 5-6 10 

mm  right Journey II BCS XLPE  articular insert, 32 mm oval Kiana II 

resurfacing patellar component 





Procedure in Detail:





The patient was identified in the preoperative holding area, and the correct 

right lower extremity was marked by me. The patient was taken to the operating 

room where the patient was anesthetized by the Department of Anesthesia. 

Preoperative antibiotics were given intravenously. Tranexamic acid 1 g was 

given intravenously for intraoperative hemostasis. A "time-out" procedure was 

performed. The patient was positioned supine on the operative table with a 

tourniquet on the upper right thigh. The right lower limb was thoroughly 

scrubbed, then sterile surgical prep solution was applied, and the limb was 

draped in sterile fashion.  An impervious stockinet and adhesive drape were 

used such that the skin was entirely covered.  An Pagan leg gerber was used.





The operating team wore personal exhaust-ventilated hoods. The limb 

exsanguinated with an Esmarch bandage, and the tourniquet was inflated.  A 

midline skin incision was made with a scalpel using the patella and tibial 

tubercle as landmarks. Electrocautery was used for hemostasis. My assistant 

used rake retractors.





A medial parapatellar arthrotomy incision was used with extension into the 

distal quadriceps tendon. The patella was retracted laterally and Hohmann 

retractors were now used by my assistant. Excess synovium, the menisci, and the 

cruciate ligaments were resected sharply.  The patella was assessed and excess 

synovium and osteophytes around the patellar articulation were removed. The 

patella was measured with a caliper, cut freehand with a saw using caliper 

measurements, sized, and then drilled for an oval three-pegged patella 

component.  A periarticular multimodal ropivacaine anesthetic injection was 

used in the suprapatellar pouch and distal quadriceps muscle.





Whitesides's line and the transepicondylar axis were marked on the femur.  An 

intra-medullary 5 degree cutting guide was pinned to the femur, and a distal 

femoral cut was made with an oscillating saw.  An additional 2 mm resection was 

used due to the deep femoral sulcus, and deficient condyle. My assistant held 

Hohmann retractors and an [facility] retractor to protect the medial and lateral 

collateral ligaments, the patellar tendon, the skin and the other soft tissues.

  A posterior referencing guide was applied with external rotation of 3  to 

match Whitesides line.  A 5-in-1 Journey II cutting guide was then applied and 

pinned to the femur.  The posterior, anterior, and all chamfer cuts were made 

with the oscillating saw.





An extramedullary guide was pinned to the tibia and rotational alignment and 

the planned resection thickness assessed.  An external alignment trey was used 

to verify the planned cut in the varus-valgus plane and regarding posterior 

slope referencing the tibial tubercle, the tibial shaft, the ankle joint, and 

the second metatarsal.  The upper tibia was cut made with an oscillating saw.  

My assistant held Hohmann retractors and a posterior cruciate ligament 

retractor to protect the medial and lateral collateral ligaments, the patellar 

tendon, the skin, the peroneal nerve and the other soft tissues. The upper 

tibia was sized with a trial baseplate. The posterior compartment was cleared 

of osteophytes and loose bodies. The periarticular anesthetic injection was 

used in the posterior compartment.





The box cut for a posterior stabilized component was made.  A preliminary 

reduction was performed with a trial femur, trial tibial baseplate and trial 

polyethylene.  Soft-tissue balancing was now performed, and extension and 

rotation of the alignments was checked using a guide trey in the tibial trial 

and a guide pin in the femur.  A medial release was required, using a 10 blade 

scalpel, and a Regan elevator to elevate the medial structures from the upper 

medial tibia.   The stability was assessed using different thicknesses of 

tibial articular surface to find satisfactory stability and good range of 

motion.  The rotation of the tibial component was marked on the upper tibia. 

Final trial reduction was now performed verifying patella tracking and 

tibiofemoral stability and alignment.


The tibia preparation was completed with a drill, saw, and fin punch at the 

previously noted rotation. The final implants were verified and opened. Outer 

gloves were changed by the operating team. The bone cuts were washed thoroughly 

with the Sharon InterPulse device and dried.





Two packages of [employer] Rally HV bone cement were mixed in powdered form 

with Vancomycin 1gm and Tobramycin 1.2 gm, and then vacuum-mixed with the 

monomer, and placed into a cement gun. The cut surfaces of the bone were 

thoroughly dried with Velazquez-tip suction and with laparotomy sponges for 

cement interdigitation. The final components were cemented into place. The knee 

was kept at full extension while the cement hardened, and excess cement was 

removed.  A Betadine lavage was used throughout the surgical exposure, and 

allowed to sit in contact with the exposed joint surfaces for three minutes 

while the cement hardened.    Tranexamic acid 1 g was redosed intravenously for 

additional intraoperative hemostasis.  The tourniquet was released, and 

electrocautery was used for hemostasis.  A final periarticular anesthetic 

injection was used for pain relief.  





A final check of range-of-motion and stability was made, and the polyethylene 

implant final size was chosen.  The polyethylene implant was secured to the 

tibial baseplate, and the knee was reduced a final time and range of motion and 

stability was confirmed.  Thorough irrigation was used.  Hemovac and pain 

catheter were used.The arthrotomy was closed with interrupted figure-of-eight #

1 PDS suture.  The arthrotomy incision was then run with #1 STRATAFIX Symmetric 

PDS Plus Knotless suture.  





The  subcutaneous tissues were reapproximated initially with 2-0 PDS . Next the 

subcuticular layer was reapproximated in a running fashion with #3-0 Stratafix 

suture by my assistant.   The skin incision was then covered and reinforced 

with Acticoat, followed by a STEPHANIE single use negative pressure wound therapy 

dressing  Soft roll and an Ace wrap were applied. Needle and sponge counts were 

correct. There were no apparent complications. The patient returned to the 

recovery room in stable condition.











ERLINDA CADET MD Mar 19, 2019 11:26

## 2019-03-20 VITALS — SYSTOLIC BLOOD PRESSURE: 92 MMHG | DIASTOLIC BLOOD PRESSURE: 62 MMHG

## 2019-03-20 VITALS — SYSTOLIC BLOOD PRESSURE: 120 MMHG | DIASTOLIC BLOOD PRESSURE: 67 MMHG

## 2019-03-20 VITALS — DIASTOLIC BLOOD PRESSURE: 43 MMHG | SYSTOLIC BLOOD PRESSURE: 91 MMHG

## 2019-03-20 VITALS — SYSTOLIC BLOOD PRESSURE: 109 MMHG | DIASTOLIC BLOOD PRESSURE: 76 MMHG

## 2019-03-20 LAB
ERYTHROCYTE [DISTWIDTH] IN BLOOD BY AUTOMATED COUNT: 14.1 % (ref 11.5–14.5)
HCT VFR BLD CALC: 39.2 % (ref 39–53)
HGB BLD-MCNC: 12.9 G/DL (ref 13–17.5)
MCH RBC QN AUTO: 30 PG (ref 25–35)
MCHC RBC AUTO-ENTMCNC: 33 G/DL (ref 31–37)
MCV RBC AUTO: 91 FL (ref 79–100)
PLATELET # BLD AUTO: 205 X10^3/UL (ref 140–400)
RBC # BLD AUTO: 4.33 X10^6/UL (ref 4.3–5.7)
WBC # BLD AUTO: 12.5 X10^3/UL (ref 4–11)

## 2019-03-20 RX ADMIN — OXYCODONE HYDROCHLORIDE AND ACETAMINOPHEN PRN TAB: 7.5; 325 TABLET ORAL at 17:14

## 2019-03-20 RX ADMIN — INSULIN LISPRO SCH UNITS: 100 INJECTION, SOLUTION INTRAVENOUS; SUBCUTANEOUS at 12:00

## 2019-03-20 RX ADMIN — ENOXAPARIN SODIUM SCH MG: 40 INJECTION SUBCUTANEOUS at 04:48

## 2019-03-20 RX ADMIN — KETOROLAC TROMETHAMINE SCH MLS/HR: 30 INJECTION, SOLUTION INTRAMUSCULAR at 06:00

## 2019-03-20 RX ADMIN — INSULIN LISPRO SCH UNITS: 100 INJECTION, SOLUTION INTRAVENOUS; SUBCUTANEOUS at 08:00

## 2019-03-20 RX ADMIN — INSULIN LISPRO SCH UNITS: 100 INJECTION, SOLUTION INTRAVENOUS; SUBCUTANEOUS at 17:00

## 2019-03-20 RX ADMIN — ONDANSETRON SCH MG: 2 INJECTION INTRAMUSCULAR; INTRAVENOUS at 00:00

## 2019-03-20 RX ADMIN — OXYCODONE HYDROCHLORIDE AND ACETAMINOPHEN PRN TAB: 5; 325 TABLET ORAL at 08:14

## 2019-03-20 RX ADMIN — MULTIPLE VITAMINS W/ MINERALS TAB SCH TAB: TAB at 08:15

## 2019-03-20 RX ADMIN — ASPIRIN SCH MG: 325 TABLET, DELAYED RELEASE ORAL at 20:47

## 2019-03-20 RX ADMIN — MELOXICAM SCH MG: 7.5 TABLET ORAL at 08:15

## 2019-03-20 RX ADMIN — OXYCODONE HYDROCHLORIDE AND ACETAMINOPHEN PRN TAB: 5; 325 TABLET ORAL at 04:47

## 2019-03-20 RX ADMIN — OXYCODONE HYDROCHLORIDE AND ACETAMINOPHEN PRN TAB: 7.5; 325 TABLET ORAL at 20:47

## 2019-03-20 RX ADMIN — ONDANSETRON SCH MG: 4 TABLET, ORALLY DISINTEGRATING ORAL at 06:00

## 2019-03-20 RX ADMIN — PSYLLIUM HUSK SCH PKT: 3.4 POWDER ORAL at 10:22

## 2019-03-20 RX ADMIN — ASPIRIN SCH MG: 325 TABLET, DELAYED RELEASE ORAL at 08:16

## 2019-03-20 RX ADMIN — FINASTERIDE SCH MG: 5 TABLET, FILM COATED ORAL at 20:47

## 2019-03-20 RX ADMIN — ONDANSETRON SCH MG: 4 TABLET, ORALLY DISINTEGRATING ORAL at 00:00

## 2019-03-20 RX ADMIN — SENNOSIDES AND DOCUSATE SODIUM SCH TAB: 8.6; 5 TABLET ORAL at 08:15

## 2019-03-20 RX ADMIN — ONDANSETRON SCH MG: 2 INJECTION INTRAMUSCULAR; INTRAVENOUS at 06:00

## 2019-03-20 RX ADMIN — TAMSULOSIN HYDROCHLORIDE SCH MG: 0.4 CAPSULE ORAL at 20:47

## 2019-03-20 RX ADMIN — LISINOPRIL SCH MG: 20 TABLET ORAL at 12:36

## 2019-03-20 RX ADMIN — OXYCODONE HYDROCHLORIDE AND ACETAMINOPHEN PRN TAB: 7.5; 325 TABLET ORAL at 12:34

## 2019-03-20 RX ADMIN — BACITRACIN SCH MLS/HR: 5000 INJECTION, POWDER, FOR SOLUTION INTRAMUSCULAR at 11:30

## 2019-03-20 NOTE — NUR
Saline lock DC'd earlier per pt request.

Right knee dressing foam changed due to increased drainage.  DEVONTE hose removed.

## 2019-03-21 VITALS — SYSTOLIC BLOOD PRESSURE: 103 MMHG | DIASTOLIC BLOOD PRESSURE: 69 MMHG

## 2019-03-21 VITALS — SYSTOLIC BLOOD PRESSURE: 113 MMHG | DIASTOLIC BLOOD PRESSURE: 65 MMHG

## 2019-03-21 LAB
HCT VFR BLD CALC: 35.9 % (ref 39–53)
HGB BLD-MCNC: 12.4 G/DL (ref 13–17.5)
MCHC RBC AUTO-ENTMCNC: 34 G/DL (ref 31–37)

## 2019-03-21 RX ADMIN — PSYLLIUM HUSK SCH PKT: 3.4 POWDER ORAL at 07:46

## 2019-03-21 RX ADMIN — INSULIN LISPRO SCH UNITS: 100 INJECTION, SOLUTION INTRAVENOUS; SUBCUTANEOUS at 11:42

## 2019-03-21 RX ADMIN — OXYCODONE HYDROCHLORIDE AND ACETAMINOPHEN PRN TAB: 7.5; 325 TABLET ORAL at 05:04

## 2019-03-21 RX ADMIN — OXYCODONE HYDROCHLORIDE AND ACETAMINOPHEN PRN TAB: 7.5; 325 TABLET ORAL at 13:03

## 2019-03-21 RX ADMIN — ASPIRIN SCH MG: 325 TABLET, DELAYED RELEASE ORAL at 21:15

## 2019-03-21 RX ADMIN — OXYCODONE HYDROCHLORIDE AND ACETAMINOPHEN PRN TAB: 7.5; 325 TABLET ORAL at 00:57

## 2019-03-21 RX ADMIN — OXYCODONE HYDROCHLORIDE AND ACETAMINOPHEN PRN TAB: 7.5; 325 TABLET ORAL at 17:22

## 2019-03-21 RX ADMIN — ENOXAPARIN SODIUM SCH MG: 40 INJECTION SUBCUTANEOUS at 05:05

## 2019-03-21 RX ADMIN — OXYCODONE HYDROCHLORIDE AND ACETAMINOPHEN PRN TAB: 7.5; 325 TABLET ORAL at 09:08

## 2019-03-21 RX ADMIN — TAMSULOSIN HYDROCHLORIDE SCH MG: 0.4 CAPSULE ORAL at 21:15

## 2019-03-21 RX ADMIN — SENNOSIDES AND DOCUSATE SODIUM SCH TAB: 8.6; 5 TABLET ORAL at 07:47

## 2019-03-21 RX ADMIN — INSULIN LISPRO SCH UNITS: 100 INJECTION, SOLUTION INTRAVENOUS; SUBCUTANEOUS at 07:08

## 2019-03-21 RX ADMIN — OXYCODONE HYDROCHLORIDE AND ACETAMINOPHEN PRN TAB: 7.5; 325 TABLET ORAL at 21:16

## 2019-03-21 RX ADMIN — MELOXICAM SCH MG: 7.5 TABLET ORAL at 07:47

## 2019-03-21 RX ADMIN — INSULIN LISPRO SCH UNITS: 100 INJECTION, SOLUTION INTRAVENOUS; SUBCUTANEOUS at 16:11

## 2019-03-21 RX ADMIN — MULTIPLE VITAMINS W/ MINERALS TAB SCH TAB: TAB at 07:47

## 2019-03-21 RX ADMIN — FINASTERIDE SCH MG: 5 TABLET, FILM COATED ORAL at 21:15

## 2019-03-21 RX ADMIN — ASPIRIN SCH MG: 325 TABLET, DELAYED RELEASE ORAL at 07:47

## 2019-03-21 RX ADMIN — LISINOPRIL SCH MG: 20 TABLET ORAL at 07:50

## 2019-03-22 VITALS — SYSTOLIC BLOOD PRESSURE: 126 MMHG | DIASTOLIC BLOOD PRESSURE: 64 MMHG

## 2019-03-22 VITALS — SYSTOLIC BLOOD PRESSURE: 115 MMHG | DIASTOLIC BLOOD PRESSURE: 62 MMHG

## 2019-03-22 LAB
BASOPHILS # BLD AUTO: 0 X10^3/UL (ref 0–0.2)
BASOPHILS NFR BLD: 1 % (ref 0–3)
EOSINOPHIL NFR BLD: 0.2 X10^3/UL (ref 0–0.7)
EOSINOPHIL NFR BLD: 3 % (ref 0–3)
ERYTHROCYTE [DISTWIDTH] IN BLOOD BY AUTOMATED COUNT: 14 % (ref 11.5–14.5)
HCT VFR BLD CALC: 38.6 % (ref 39–53)
HGB BLD-MCNC: 13.2 G/DL (ref 13–17.5)
LYMPHOCYTES # BLD: 2.3 X10^3/UL (ref 1–4.8)
LYMPHOCYTES NFR BLD AUTO: 28 % (ref 24–48)
MCH RBC QN AUTO: 31 PG (ref 25–35)
MCHC RBC AUTO-ENTMCNC: 34 G/DL (ref 31–37)
MCV RBC AUTO: 91 FL (ref 79–100)
MONO #: 0.9 X10^3/UL (ref 0–1.1)
MONOCYTES NFR BLD: 11 % (ref 0–9)
NEUT #: 4.8 X10^3UL (ref 1.8–7.7)
NEUTROPHILS NFR BLD AUTO: 58 % (ref 31–73)
PLATELET # BLD AUTO: 195 X10^3/UL (ref 140–400)
RBC # BLD AUTO: 4.26 X10^6/UL (ref 4.3–5.7)
WBC # BLD AUTO: 8.2 X10^3/UL (ref 4–11)

## 2019-03-22 RX ADMIN — OXYCODONE HYDROCHLORIDE AND ACETAMINOPHEN PRN TAB: 7.5; 325 TABLET ORAL at 11:51

## 2019-03-22 RX ADMIN — LISINOPRIL SCH MG: 20 TABLET ORAL at 07:54

## 2019-03-22 RX ADMIN — OXYCODONE HYDROCHLORIDE AND ACETAMINOPHEN PRN TAB: 7.5; 325 TABLET ORAL at 07:53

## 2019-03-22 RX ADMIN — INSULIN LISPRO SCH UNITS: 100 INJECTION, SOLUTION INTRAVENOUS; SUBCUTANEOUS at 11:17

## 2019-03-22 RX ADMIN — INSULIN LISPRO SCH UNITS: 100 INJECTION, SOLUTION INTRAVENOUS; SUBCUTANEOUS at 15:22

## 2019-03-22 RX ADMIN — SENNOSIDES AND DOCUSATE SODIUM SCH TAB: 8.6; 5 TABLET ORAL at 07:53

## 2019-03-22 RX ADMIN — MULTIPLE VITAMINS W/ MINERALS TAB SCH TAB: TAB at 07:52

## 2019-03-22 RX ADMIN — OXYCODONE HYDROCHLORIDE AND ACETAMINOPHEN PRN TAB: 7.5; 325 TABLET ORAL at 02:29

## 2019-03-22 RX ADMIN — MELOXICAM SCH MG: 7.5 TABLET ORAL at 07:53

## 2019-03-22 RX ADMIN — ASPIRIN SCH MG: 325 TABLET, DELAYED RELEASE ORAL at 07:53

## 2019-03-22 RX ADMIN — PSYLLIUM HUSK SCH PKT: 3.4 POWDER ORAL at 07:52

## 2019-03-22 RX ADMIN — INSULIN LISPRO SCH UNITS: 100 INJECTION, SOLUTION INTRAVENOUS; SUBCUTANEOUS at 07:21

## 2019-03-22 RX ADMIN — OXYCODONE HYDROCHLORIDE AND ACETAMINOPHEN PRN TAB: 7.5; 325 TABLET ORAL at 15:49

## 2019-03-22 RX ADMIN — ENOXAPARIN SODIUM SCH MG: 40 INJECTION SUBCUTANEOUS at 06:31

## 2019-03-22 NOTE — PDOC3
Discharge Summary


Visit Information


Date of Admission:  Mar 19, 2019


Date of Discharge:  Mar 22, 2019


Final Diagnosis


right knee osteoarthritis


history of DVT





Brief Hospital Course


Allergies





 Allergies








Coded Allergies Type Severity Reaction Last Updated Verified


 


  No Known Drug Allergies    5/15/17 No








Vital Signs





Vital Signs








  Date Time  Temp Pulse Resp B/P (MAP) Pulse Ox O2 Delivery O2 Flow Rate FiO2


 


3/22/19 11:51      Room Air  


 


3/22/19 07:54    114/62    


 


3/22/19 06:28 97.9 72 16  96   





 97.9       








Lab Results





Laboratory Tests








Test


 3/20/19


16:22 3/21/19


03:40 3/21/19


06:37 3/21/19


11:40


 


Glucose (Fingerstick)


 112 mg/dL


(70-99) 


 124 mg/dL


(70-99) 119 mg/dL


(70-99)


 


Hemoglobin


 


 12.4 g/dL


(13.0-17.5) 


 





 


Hematocrit


 


 35.9 %


(39.0-53.0) 


 





 


Mean Corpuscular Hemoglobin


Concent 


 34 g/dL


(31-37) 


 





 


Test


 3/21/19


16:11 3/22/19


06:27 3/22/19


07:15 





 


Glucose (Fingerstick)


 129 mg/dL


(70-99) 113 mg/dL


(70-99) 


 





 


White Blood Count


 


 


 8.2 x10^3/uL


(4.0-11.0) 





 


Red Blood Count


 


 


 4.26 x10^6/uL


(4.30-5.70) 





 


Hemoglobin


 


 


 13.2 g/dL


(13.0-17.5) 





 


Hematocrit


 


 


 38.6 %


(39.0-53.0) 





 


Mean Corpuscular Volume   91 fL ()  


 


Mean Corpuscular Hemoglobin   31 pg (25-35)  


 


Mean Corpuscular Hemoglobin


Concent 


 


 34 g/dL


(31-37) 





 


Red Cell Distribution Width


 


 


 14.0 %


(11.5-14.5) 





 


Platelet Count


 


 


 195 x10^3/uL


(140-400) 





 


Neutrophils (%) (Auto)   58 % (31-73)  


 


Lymphocytes (%) (Auto)   28 % (24-48)  


 


Monocytes (%) (Auto)   11 % (0-9)  


 


Eosinophils (%) (Auto)   3 % (0-3)  


 


Basophils (%) (Auto)   1 % (0-3)  


 


Neutrophils # (Auto)


 


 


 4.8 x10^3uL


(1.8-7.7) 





 


Lymphocytes # (Auto)


 


 


 2.3 x10^3/uL


(1.0-4.8) 





 


Monocytes # (Auto)


 


 


 0.9 x10^3/uL


(0.0-1.1) 





 


Eosinophils # (Auto)


 


 


 0.2 x10^3/uL


(0.0-0.7) 





 


Basophils # (Auto)


 


 


 0.0 x10^3/uL


(0.0-0.2) 











Laboratory Tests








Test


 3/21/19


16:11 3/22/19


06:27 3/22/19


07:15


 


Glucose (Fingerstick)


 129 mg/dL


(70-99) 113 mg/dL


(70-99) 





 


White Blood Count


 


 


 8.2 x10^3/uL


(4.0-11.0)


 


Red Blood Count


 


 


 4.26 x10^6/uL


(4.30-5.70)


 


Hemoglobin


 


 


 13.2 g/dL


(13.0-17.5)


 


Hematocrit


 


 


 38.6 %


(39.0-53.0)


 


Mean Corpuscular Volume   91 fL () 


 


Mean Corpuscular Hemoglobin   31 pg (25-35) 


 


Mean Corpuscular Hemoglobin


Concent 


 


 34 g/dL


(31-37)


 


Red Cell Distribution Width


 


 


 14.0 %


(11.5-14.5)


 


Platelet Count


 


 


 195 x10^3/uL


(140-400)


 


Neutrophils (%) (Auto)   58 % (31-73) 


 


Lymphocytes (%) (Auto)   28 % (24-48) 


 


Monocytes (%) (Auto)   11 % (0-9) 


 


Eosinophils (%) (Auto)   3 % (0-3) 


 


Basophils (%) (Auto)   1 % (0-3) 


 


Neutrophils # (Auto)


 


 


 4.8 x10^3uL


(1.8-7.7)


 


Lymphocytes # (Auto)


 


 


 2.3 x10^3/uL


(1.0-4.8)


 


Monocytes # (Auto)


 


 


 0.9 x10^3/uL


(0.0-1.1)


 


Eosinophils # (Auto)


 


 


 0.2 x10^3/uL


(0.0-0.7)


 


Basophils # (Auto)


 


 


 0.0 x10^3/uL


(0.0-0.2)








Brief Hospital Course


64 old who presented with knee osteoarthritis, for elective total knee 

arthroplasty.  The patient underwent total knee arthroplasty under general 

anesthesia the day of admission.  Perioperative antibiotics and DVT prophylaxis 

were used.  Postoperatively physical therapy and case management were 

consulted.  The patient progressed and is stable for discharge.





Discharge Information


Condition at Discharge:  Stable


Follow Up:  Weeks


Disposition/Orders:  D/C to Another Facility


Scheduled


Aspirin (Aspirin Ec), 1 TAB PO DAILY


Enalapril Maleate (Vasotec), 10 MG PO DAILY, (Reported)


Enoxaparin Sodium (Enoxaparin Sodium), 40 MG SQ Q24H


Finasteride (Finasteride), 5 MG PO HS, (Reported)


Ketoconazole (Ketoconazole), 15 GM TP BID, (Reported)


Meloxicam (Meloxicam), 15 MG PO DAILY, (Reported)


Metformin Hcl (Metformin Hcl), 1,000 MG PO BID, (Reported)


Tamsulosin Hcl (Flomax), 1 CAP PO HS, (Reported)





Scheduled PRN


Triamcinolone Acetonide (Triamcinolone Acetonide 0.025% Cream), 1 VASQUEZ TP PRN 

DAILY PRN for RASH, (Reported)





Patient Instructions


Continue to WBAT with walker. Keep dressing dry and intact. F/U with [facility] in 

10-14 days. Call for appointment.


Physical therapy for TKA


Continue DVT prophylaxis.  Use aspirin 325 mg daily for 30 days, and in 

addition use Lovenox 40 mg SQ daily for 14 days..











ERLINDA STRICKLAND MD Mar 22, 2019 12:38

## 2019-03-22 NOTE — NUR
Patient left the building to go to [facility] per transport personnel in a wheelchair 
around 1605. Discharge instructions given to him prior to transportation and Report was 
called to [facility] Wasserman at [facility] around 1515. Patient stable upon transfer with no concerns noted. He 
left with all of his belongings.

## 2019-03-22 NOTE — SNU/HH DC
DISCHARGE ORDERS


DISCHARGE INFORMATION:


DISCHARGE DATE:  Mar 22, 2019


FINAL DIAGNOSIS


osteoarthritis right knee


history of DVT


CONDITION ON DISCHARGE:  Stable





CODE STATUS:


Code Status:  Full





SKILLED NURSING:


SNF STAY <30 DAYS:  Yes





HOSPICE:


HOSPICE:  No


HOSPICE EVAL & TREAT:  No





LTAC:


ADMIT TO LTAC:  No





POST DISCHARGE ORDERS:


ACTIVITY ORDERS:  Activity as tolerated, Avoid exertion, Progressive ambulation


WEIGHT BEARING STATUS:  As tolerated


BATHING ORDERS:  Shower-keep dressing dry


DIET AFTER DISCHARGE:  Regular


WOUND/INCISION CARE:  Ice to area for comfort, Keep wound/cast CDI, Keep wound 

elevated, Do not change dressing, Reinforce dressing PRN


OTHER WOUND INSTRUCTIONS:  DO NOT change STEPHANIE dressing, disconnect battery on 3/

26





CHECKS AFTER DISCHARGE:


CHECKS AFTER DISCHARGE:  Check blood press - daily, Check blood sugar, ac/hs, 

Check your Temp as needed





FOLLOW-UP:


ADDITIONAL FOLLOW-UP:  Dr Cadet in 10-14 days [phone]


ANTICOAGULATION F/U NEEDED:  Aspirin  mg po qd x30 days. Also use Lovenox 

40 mg SQ qd x14 days.





TREATMENT/EQUIPMENT ORDERS:


ADAPTIVE EQUIPMENT NEEDED:  Front wheeled walker


Physical Therapy For:  Evalulation/Treatment


Occupational Therapy For:  Evaluation/Treatment





DISCHARGE MEDICATIONS:


Home Meds


Active Scripts


Aspirin (ASPIRIN EC) 325 Mg Tablet.dr, 1 TAB PO DAILY for prevent blood clots 

for 30 Days, #30 TAB 0 Refills


   Prov:ERLINDA CADET MD         3/22/19


Enoxaparin Sodium (ENOXAPARIN SODIUM) 40 Mg/0.4 Ml Disp.syrin, 40 MG SQ Q24H 

for prevent blood clots for 14 Days, #14 DIS.SYR


   40 mg SQ daily for 14 days


   Prov:ERLINDA CADET MD         3/22/19


Reported Medications


Ketoconazole (KETOCONAZOLE) 15 Gm Cream..g., 15 GM TP BID for RASH, EACH


   2/22/19


Triamcinolone Acetonide (TRIAMCINOLONE ACETONIDE 0.025% CREAM) 15 Gm Cream..g., 

1 VASQUEZ TP PRN DAILY PRN for RASH, TUBE


   2/22/19


Meloxicam (MELOXICAM) 15 Mg Tablet, 15 MG PO DAILY for ANTIINFLAMMATORY, TAB


   2/22/19


Enalapril Maleate (VASOTEC) 20 Mg Tablet, 10 MG PO DAILY for TO CONTROL BP, TAB


   2/22/19


Finasteride (FINASTERIDE) 5 Mg Tablet, 5 MG PO HS for ENLARGED PROSTATE, TAB


   2/22/19


Tamsulosin Hcl (FLOMAX) 0.4 Mg Cap.er.24h, 1 CAP PO HS for BPH, #30 CAP 11 

Refills


   5/14/17


Metformin Hcl (METFORMIN HCL) 500 Mg Tablet, 1000 MG PO BID for TO CONTROL 

DIABETES, #60 TAB 3 Refills


   4/10/17











ERLINDA CADET MD Mar 22, 2019 12:36

## 2019-03-22 NOTE — PDOC
PROGRESS NOTES


Subjective


Subjective


No complaints.  Planning on discharge today to [facility].





Objective


Vital Signs





Vital Signs








  Date Time  Temp Pulse Resp B/P (MAP) Pulse Ox O2 Delivery O2 Flow Rate FiO2


 


3/22/19 11:51      Room Air  


 


3/22/19 07:54    114/62    


 


3/22/19 06:28 97.9 72 16  96   





 97.9       


 


3/19/19 14:37       2.0 








Physical Exam


STEPHANIE intact and dry. Good AROM ankle.  Calf soft and nontender. Minimal warmth 

or erythema.


Labs





Laboratory Tests








Test


 3/20/19


16:22 3/21/19


03:40 3/21/19


06:37 3/21/19


11:40


 


Glucose (Fingerstick)


 112 mg/dL


(70-99) 


 124 mg/dL


(70-99) 119 mg/dL


(70-99)


 


Hemoglobin


 


 12.4 g/dL


(13.0-17.5) 


 





 


Hematocrit


 


 35.9 %


(39.0-53.0) 


 





 


Mean Corpuscular Hemoglobin


Concent 


 34 g/dL


(31-37) 


 





 


Test


 3/21/19


16:11 3/22/19


06:27 3/22/19


07:15 





 


Glucose (Fingerstick)


 129 mg/dL


(70-99) 113 mg/dL


(70-99) 


 





 


White Blood Count


 


 


 8.2 x10^3/uL


(4.0-11.0) 





 


Red Blood Count


 


 


 4.26 x10^6/uL


(4.30-5.70) 





 


Hemoglobin


 


 


 13.2 g/dL


(13.0-17.5) 





 


Hematocrit


 


 


 38.6 %


(39.0-53.0) 





 


Mean Corpuscular Volume   91 fL ()  


 


Mean Corpuscular Hemoglobin   31 pg (25-35)  


 


Mean Corpuscular Hemoglobin


Concent 


 


 34 g/dL


(31-37) 





 


Red Cell Distribution Width


 


 


 14.0 %


(11.5-14.5) 





 


Platelet Count


 


 


 195 x10^3/uL


(140-400) 





 


Neutrophils (%) (Auto)   58 % (31-73)  


 


Lymphocytes (%) (Auto)   28 % (24-48)  


 


Monocytes (%) (Auto)   11 % (0-9)  


 


Eosinophils (%) (Auto)   3 % (0-3)  


 


Basophils (%) (Auto)   1 % (0-3)  


 


Neutrophils # (Auto)


 


 


 4.8 x10^3uL


(1.8-7.7) 





 


Lymphocytes # (Auto)


 


 


 2.3 x10^3/uL


(1.0-4.8) 





 


Monocytes # (Auto)


 


 


 0.9 x10^3/uL


(0.0-1.1) 





 


Eosinophils # (Auto)


 


 


 0.2 x10^3/uL


(0.0-0.7) 





 


Basophils # (Auto)


 


 


 0.0 x10^3/uL


(0.0-0.2) 











Laboratory Tests








Test


 3/21/19


16:11 3/22/19


06:27 3/22/19


07:15


 


Glucose (Fingerstick)


 129 mg/dL


(70-99) 113 mg/dL


(70-99) 





 


White Blood Count


 


 


 8.2 x10^3/uL


(4.0-11.0)


 


Red Blood Count


 


 


 4.26 x10^6/uL


(4.30-5.70)


 


Hemoglobin


 


 


 13.2 g/dL


(13.0-17.5)


 


Hematocrit


 


 


 38.6 %


(39.0-53.0)


 


Mean Corpuscular Volume   91 fL () 


 


Mean Corpuscular Hemoglobin   31 pg (25-35) 


 


Mean Corpuscular Hemoglobin


Concent 


 


 34 g/dL


(31-37)


 


Red Cell Distribution Width


 


 


 14.0 %


(11.5-14.5)


 


Platelet Count


 


 


 195 x10^3/uL


(140-400)


 


Neutrophils (%) (Auto)   58 % (31-73) 


 


Lymphocytes (%) (Auto)   28 % (24-48) 


 


Monocytes (%) (Auto)   11 % (0-9) 


 


Eosinophils (%) (Auto)   3 % (0-3) 


 


Basophils (%) (Auto)   1 % (0-3) 


 


Neutrophils # (Auto)


 


 


 4.8 x10^3uL


(1.8-7.7)


 


Lymphocytes # (Auto)


 


 


 2.3 x10^3/uL


(1.0-4.8)


 


Monocytes # (Auto)


 


 


 0.9 x10^3/uL


(0.0-1.1)


 


Eosinophils # (Auto)


 


 


 0.2 x10^3/uL


(0.0-0.7)


 


Basophils # (Auto)


 


 


 0.0 x10^3/uL


(0.0-0.2)











Assessment


Assessment


POD #3 TKA





Plan


Plan of Care


Discharge planning for today.  Continue PT and DVT prophylaxis.  F/U 10-14 days 

in office.











ERLINDA STRICKLAND MD Mar 22, 2019 12:29

## 2019-03-25 NOTE — PATHOLOGY
[facility] Accession Number: [id number]

.                                                                01

Material submitted:                                        .

RIGHT KNEE BONE

.                                                                01

Clinical history:                                          .

Right knee osteoarthritis

.                                                                02

**********************************************************************

Diagnosis:

"Right knee bone", removal:

- Degenerative osteoarthritis.

(SKM:sudarshan; 03/21/2019)

MBR/03/21/2019

**********************************************************************

.                                                                02

Electronically signed:                                     .

Rk Davila MD, Pathologist

NPI- [id number]

.                                                                01

Gross description:                                         .

The specimen is received in formalin, labeled "Apolinar Salas, right knee

bone", are multiple segments of tan-brown bone, gray-white fibrous and tan

rubbery tissues consisting of recognizable portion of tibia plateau,

patella and meniscus measuring 10.5 x 7.5 x 3.0 cm in aggregate.

Eburnation and peripheral osteophytes are identified.  Representative

tissue is submitted in A1 after decalcification.

([facility]; 03/19/2019)

SHS/SHS

.                                                                02

Pathologist provided ICD-10:

M17.11

.                                                                02

CPT                                                        .

[id number], [id number]

Specimen Comment: A courtesy copy of this report has been sent to

Specimen Comment: [phone], [phone].

Specimen Comment: Report sent to  / DR GIBSON

***Performed at:  01

   [facility]

   [address], [place]  [id number]

   MD Noble Collier MD Phone:  [phone]

***Performed at:  02

   [facility]

   [address], [place]  [id number]

   MD Dewey Strauss MD Phone:  [phone]

## 2020-08-23 ENCOUNTER — HOSPITAL ENCOUNTER (EMERGENCY)
Dept: HOSPITAL 63 - ER | Age: 65
Discharge: HOME | End: 2020-08-23
Payer: COMMERCIAL

## 2020-08-23 VITALS — DIASTOLIC BLOOD PRESSURE: 79 MMHG | SYSTOLIC BLOOD PRESSURE: 130 MMHG

## 2020-08-23 VITALS — WEIGHT: 260.59 LBS | BODY MASS INDEX: 38.6 KG/M2 | HEIGHT: 69 IN

## 2020-08-23 DIAGNOSIS — I10: ICD-10-CM

## 2020-08-23 DIAGNOSIS — Z87.440: ICD-10-CM

## 2020-08-23 DIAGNOSIS — E11.65: ICD-10-CM

## 2020-08-23 DIAGNOSIS — N30.01: Primary | ICD-10-CM

## 2020-08-23 DIAGNOSIS — Z87.442: ICD-10-CM

## 2020-08-23 LAB
% BANDS: 10 % (ref 0–9)
% LYMPHS: 15 % (ref 24–48)
% MONOS: 7 % (ref 0–10)
% SEGS: 67 % (ref 35–66)
ALBUMIN SERPL-MCNC: 3.8 G/DL (ref 3.4–5)
ALBUMIN/GLOB SERPL: 1.3 {RATIO} (ref 1–1.7)
ALP SERPL-CCNC: 100 U/L (ref 46–116)
ALT SERPL-CCNC: 27 U/L (ref 16–63)
ANION GAP SERPL CALC-SCNC: 13 MMOL/L (ref 6–14)
APTT PPP: YELLOW S
AST SERPL-CCNC: 17 U/L (ref 15–37)
BACTERIA #/AREA URNS HPF: (no result) /HPF
BASOPHILS # BLD AUTO: 0.1 X10^3/UL (ref 0–0.2)
BASOPHILS NFR BLD AUTO: 1 % (ref 0–3)
BASOPHILS NFR BLD: 1 % (ref 0–3)
BILIRUB SERPL-MCNC: 1.1 MG/DL (ref 0.2–1)
BILIRUB UR QL STRIP: (no result)
BUN/CREAT SERPL: 14 (ref 6–20)
CA-I SERPL ISE-MCNC: 15 MG/DL (ref 8–26)
CALCIUM SERPL-MCNC: 9.6 MG/DL (ref 8.5–10.1)
CHLORIDE SERPL-SCNC: 97 MMOL/L (ref 98–107)
CO2 SERPL-SCNC: 25 MMOL/L (ref 21–32)
CREAT SERPL-MCNC: 1.1 MG/DL (ref 0.7–1.3)
EOSINOPHIL NFR BLD: 0.1 X10^3/UL (ref 0–0.7)
EOSINOPHIL NFR BLD: 1 % (ref 0–3)
ERYTHROCYTE [DISTWIDTH] IN BLOOD BY AUTOMATED COUNT: 13.2 % (ref 11.5–14.5)
FIBRINOGEN PPP-MCNC: (no result) MG/DL
GFR SERPLBLD BASED ON 1.73 SQ M-ARVRAT: 66.8 ML/MIN
GLOBULIN SER-MCNC: 3 G/DL (ref 2.2–3.8)
GLUCOSE SERPL-MCNC: 247 MG/DL (ref 70–99)
GLUCOSE UR STRIP-MCNC: 250 MG/DL
HCT VFR BLD CALC: 44 % (ref 39–53)
HGB BLD-MCNC: 14.8 G/DL (ref 13–17.5)
HYALINE CASTS #/AREA URNS LPF: (no result) /HPF
LYMPHOCYTES # BLD: 2.4 X10^3/UL (ref 1–4.8)
LYMPHOCYTES NFR BLD AUTO: 15 % (ref 24–48)
MCH RBC QN AUTO: 31 PG (ref 25–35)
MCHC RBC AUTO-ENTMCNC: 34 G/DL (ref 31–37)
MCV RBC AUTO: 91 FL (ref 79–100)
MONO #: 1.3 X10^3/UL (ref 0–1.1)
MONOCYTES NFR BLD: 8 % (ref 0–9)
NEUT #: 12.4 X10^3UL (ref 1.8–7.7)
NEUTROPHILS NFR BLD AUTO: 76 % (ref 31–73)
NITRITE UR QL STRIP: (no result)
PLATELET # BLD AUTO: 250 X10^3/UL (ref 140–400)
PLATELET # BLD EST: ADEQUATE 10*3/UL
POTASSIUM SERPL-SCNC: 3.6 MMOL/L (ref 3.5–5.1)
PROT SERPL-MCNC: 6.8 G/DL (ref 6.4–8.2)
RBC # BLD AUTO: 4.83 X10^6/UL (ref 4.3–5.7)
RBC #/AREA URNS HPF: (no result) /HPF (ref 0–2)
SODIUM SERPL-SCNC: 135 MMOL/L (ref 136–145)
SP GR UR STRIP: 1.02
SQUAMOUS #/AREA URNS LPF: (no result) /LPF
UROBILINOGEN UR-MCNC: 0.2 MG/DL
WBC # BLD AUTO: 16.3 X10^3/UL (ref 4–11)

## 2020-08-23 PROCEDURE — 82947 ASSAY GLUCOSE BLOOD QUANT: CPT

## 2020-08-23 PROCEDURE — 80053 COMPREHEN METABOLIC PANEL: CPT

## 2020-08-23 PROCEDURE — 36415 COLL VENOUS BLD VENIPUNCTURE: CPT

## 2020-08-23 PROCEDURE — 87086 URINE CULTURE/COLONY COUNT: CPT

## 2020-08-23 PROCEDURE — 51702 INSERT TEMP BLADDER CATH: CPT

## 2020-08-23 PROCEDURE — 93005 ELECTROCARDIOGRAM TRACING: CPT

## 2020-08-23 PROCEDURE — 85007 BL SMEAR W/DIFF WBC COUNT: CPT

## 2020-08-23 PROCEDURE — 85025 COMPLETE CBC W/AUTO DIFF WBC: CPT

## 2020-08-23 PROCEDURE — 99284 EMERGENCY DEPT VISIT MOD MDM: CPT

## 2020-08-23 PROCEDURE — 81001 URINALYSIS AUTO W/SCOPE: CPT

## 2020-08-23 PROCEDURE — 84484 ASSAY OF TROPONIN QUANT: CPT

## 2020-08-23 PROCEDURE — 96374 THER/PROPH/DIAG INJ IV PUSH: CPT

## 2020-08-23 NOTE — EKG
[facility] [address], [place] [address]

Test Date:    2020               Test Time:    09:47:12

Pat Name:     NOAH BUTT             Department:   

Patient ID:   [id number]           Room:          

Gender:       M                        Technician:   

:          1955               Requested By: TU CASTILLO

Order Number: [id number]            Reading MD:     

                                 Measurements

Intervals                              Axis          

Rate:         65                       P:            -29

NM:           130                      QRS:          -11

QRSD:         96                       T:            1

QT:           420                                    

QTc:          438                                    

                           Interpretive Statements

SINUS RHYTHM

LEFTWARD AXIS

T ABNORMALITY IN HIGH LATERAL LEADS

ABNORMAL ECG

RI6.02

No previous ECG available for comparison

## 2020-08-23 NOTE — PHYS DOC
Past History


Past Medical History:  Diabetes, Hypertension, Kidney Stones


Past Surgical History:  No Surgical History


Alcohol Use:  None


Drug Use:  None





General Adult


EDM:


Chief Complaint:  HYPERGLYCEMIA





HPI:


HPI:


65-year-old male presents with urinary retention.  The patient is on Flomax.  He

has been having difficulty off and on lately with urine stream.  Yesterday, he 

felt like he had to go several times, but the stream was decreasing.  He has not

urinated since last night.  He got up 3 times overnight but was unable to 

urinate.  He was unable to urinate this morning.  He is also been having chills 

but not a fever.  He has never had them before.  Patient has had a history of 

kidney stones and urinary tract infections in the past.  He is scheduled to see 

urology about his prostate, but that is now scheduled for a few months.  The 

patient is diabetic on metformin and his blood sugar is elevated at 239.





Review of Systems:


Review of Systems:


Constitutional:   chills 


Eyes:  Denies change in visual acuity 


HENT:  Denies nasal congestion or sore throat 


Respiratory:  Denies cough or shortness of breath 


Cardiovascular:  Denies chest pain or edema 


GI: Mild suprapubic abdominal pain, nausea, vomiting. Denies bloody stools or 

diarrhea 


: Urinary retention


Musculoskeletal:  Denies back pain or joint pain 


Integument:  Denies rash 


Neurologic:  Denies headache, focal weakness or sensory changes 


Endocrine:  Denies polyuria or polydipsia 


Lymphatic:  Denies swollen glands 


Psychiatric:  Denies depression or anxiety





Heart Score:


Risk Factors:


Risk Factors:  DM, Current or recent (<one month) smoker, HTN, HLP, family 

history of CAD, obesity.


Risk Scores:


Score 0 - 3:  2.5% MACE over next 6 weeks - Discharge Home


Score 4 - 6:  20.3% MACE over next 6 weeks - Admit for Clinical Observation


Score 7 - 10:  72.7% MACE over next 6 weeks - Early Invasive Strategies





Current Medications:


Current Meds:





Current Medications








 Medications


  (Trade)  Dose


 Ordered  Sig/Radha  Start Time


 Stop Time Status Last Admin


Dose Admin


 


 Ondansetron HCl


  (Zofran)  4 mg  1X  ONCE  8/23/20 09:45


 8/23/20 09:46   














Allergies:


Allergies:





Allergies








Coded Allergies Type Severity Reaction Last Updated Verified


 


  No Known Drug Allergies    4/10/17 No











Physical Exam:


PE:





Constitutional: Well developed, obese, well nourished, no acute distress, non-

toxic appearance. []


HENT: Normocephalic, atraumatic, bilateral external ears normal, oropharynx dry,

 no oral exudates, nose normal. []


Eyes: PERRLA, EOMI, conjunctiva normal, no discharge. [] 


Neck: Normal range of motion, no tenderness, supple, no stridor. [] 


Cardiovascular: Heart rate regular rhythm, no murmur []


Lungs & Thorax:  Bilateral breath sounds clear to auscultation []


Abdomen: Bowel sounds normal, soft, mild suprapubic tenderness, no masses, no 

pulsatile masses. [] 


Skin: Warm, dry, no erythema, no rash. [] 


Back: No tenderness, no CVA tenderness. [] 


Extremities: No tenderness, no cyanosis, no clubbing, ROM intact, no edema. [] 


Neurologic: Alert and oriented X 3, normal motor function, normal sensory 

function, no focal deficits noted. []


Psychologic: Affect normal, judgement normal, mood normal. []





Current Patient Data:


Labs:





                                Laboratory Tests








Test


 8/23/20


09:24


 


Glucose (Fingerstick)


 239 mg/dL


(70-99)  H








Vital Signs:





                                   Vital Signs








  Date Time  Temp Pulse Resp B/P (MAP) Pulse Ox O2 Delivery O2 Flow Rate FiO2


 


8/23/20 09:39 98.8 79 24 130/79 (96) 98   











EKG:


EKG:


[]





Radiology/Procedures:


Radiology/Procedures:


[]





Course & Med Decision Making:


Course & Med Decision Making


Pertinent Labs and Imaging studies reviewed. (See chart for details)


The patient's labs are remarkable for an elevated white count.  He also has 

elevated blood sugar but a normal anion gap.  The patient was given a liter 

normal saline and a Carroll catheter was placed..  He is feeling a bit better at 

this time.  His urinalysis reveals urinary tract infection.  I will treat with 1

 g of Rocephin IV followed by 7 days of Keflex.  We will remove the Carroll 

catheter.  He is stable for discharge at this time.


[]





Dragon Disclaimer:


Dragon Disclaimer:


This electronic medical record was generated, in whole or in part, using a voice

 recognition dictation system.





Departure


Departure:


Impression:  


   Primary Impression:  


   UTI (urinary tract infection)


   Qualified Codes:  N30.01 - Acute cystitis with hematuria


   Additional Impression:  


   Hyperglycemia due to diabetes mellitus


Disposition:  01 HOME/RESIDENCE PRIOR TO ADM


Condition:  STABLE


Referrals:  


CITLALY MARCIAL MD (PCP)


Patient Instructions:  Urinary Tract Infection, Easy-to-Read


Scripts


Cephalexin (KEFLEX) 500 Mg Capsule


1 CAP PO TID for UTI for 7 Days, #21 CAP 0 Refills


   Prov: TU CASTILLO DO         8/23/20





Justification of Admission:


Justification of Admission:


Justification of Admission Dx:  N/A











TU CASTILLO DO                 Aug 23, 2020 09:49

## 2020-11-06 NOTE — PDOC4
Operative Note


Operative Note


pre-op dx-right ureteral stone


procedure-cystoscopy, right retrograde pyelogram, right ureteroscopy with laser 

lithotripsy and stone extraction and stent placement


surgeon-adan claire-general


Pt. to PACU in stable condition


Keep in hospital tonight


Follow up Dr. Zhang next week for cystoscopy and stent removal in office











ANGELES ZHANG MD May 15, 2017 15:11
detailed exam